# Patient Record
Sex: FEMALE | Race: WHITE | HISPANIC OR LATINO | Employment: FULL TIME | URBAN - METROPOLITAN AREA
[De-identification: names, ages, dates, MRNs, and addresses within clinical notes are randomized per-mention and may not be internally consistent; named-entity substitution may affect disease eponyms.]

---

## 2018-03-14 ENCOUNTER — OFFICE VISIT (OUTPATIENT)
Dept: MEDICAL GROUP | Facility: LAB | Age: 28
End: 2018-03-14
Payer: COMMERCIAL

## 2018-03-14 VITALS
HEART RATE: 100 BPM | WEIGHT: 191 LBS | TEMPERATURE: 99.1 F | SYSTOLIC BLOOD PRESSURE: 148 MMHG | OXYGEN SATURATION: 98 % | HEIGHT: 66 IN | RESPIRATION RATE: 16 BRPM | BODY MASS INDEX: 30.7 KG/M2 | DIASTOLIC BLOOD PRESSURE: 106 MMHG

## 2018-03-14 DIAGNOSIS — E66.9 OBESITY (BMI 30-39.9): ICD-10-CM

## 2018-03-14 DIAGNOSIS — R03.0 ELEVATED BLOOD PRESSURE READING: ICD-10-CM

## 2018-03-14 DIAGNOSIS — Z30.41 ENCOUNTER FOR SURVEILLANCE OF CONTRACEPTIVE PILLS: ICD-10-CM

## 2018-03-14 PROBLEM — R45.0 NERVOUSNESS: Status: ACTIVE | Noted: 2018-03-14

## 2018-03-14 PROCEDURE — 99204 OFFICE O/P NEW MOD 45 MIN: CPT | Performed by: FAMILY MEDICINE

## 2018-03-14 RX ORDER — NORGESTIMATE AND ETHINYL ESTRADIOL 0.25-0.035
1 KIT ORAL DAILY
Qty: 84 TAB | Refills: 4 | Status: SHIPPED | OUTPATIENT
Start: 2018-03-14 | End: 2019-03-18 | Stop reason: SDUPTHER

## 2018-03-14 ASSESSMENT — PATIENT HEALTH QUESTIONNAIRE - PHQ9: CLINICAL INTERPRETATION OF PHQ2 SCORE: 0

## 2018-03-15 NOTE — PROGRESS NOTES
Federica Acuña is a 27 y.o. female here for   Chief Complaint   Patient presents with   • Establish Care    - birth control    HPI:  Federica is a very pleasant 27 y.o. female. Chief here today to establish care.    1. Obesity (BMI 30-39.9)  This is a new problem to discuss with me today. Patient reports several year history of increasing weight. This started about age 21. She did see her primary care physician at [place] for this. She was told that they may need to monitor her thyroid. This has never been done. Of note, the patient does have constipation at times, tremor or nervousness, cold intolerance. No skin or hair changes. No neck swelling. She has been exercising regularly.    2. Encounter for surveillance of contraceptive pills  This is new to discuss with me today. Patient has been on Sprintec for several years and likes this. She states that she would like children in the future. Perhaps in about 2 years.  No family or personal history of clotting disorders, PEs, or DVTs.  Patient does not smoke. She is not sedentary.     3. Elevated blood pressure reading  This is a new problem to discuss acne. Patient states that her blood pressure has been slightly elevated in the past but never this high. She has had some increased life stress test today that she is attributing this to. She does not take her blood pressure at home. sHe denies any headaches, chest pain, shortness of breath, vision changes, lower extremity edema, difficulty urinating.        Current medicines (including changes today)  Current Outpatient Prescriptions   Medication Sig Dispense Refill   • norgestimate-ethinyl estradiol (ORTHO-CYCLEN) 0.25-35 MG-MCG per tablet Take 1 Tab by mouth every day. 84 Tab 4     No current facility-administered medications for this visit.      She  has a past medical history of Pyloric stenosis.  She  has a past surgical history that includes abdominal exploration.  Social History   Substance Use  "Topics   • Smoking status: Never Smoker   • Smokeless tobacco: Never Used   • Alcohol use Yes      Comment: rare     Social History     Social History Narrative   • No narrative on file     Family History   Problem Relation Age of Onset   • Hypertension Mother    • Hyperlipidemia Mother    • Hypertension Father    • Cancer Maternal Grandmother 27     breast     Family Status   Relation Status   • Mother Alive   • Father Alive   • Maternal Grandmother          ROS  Positive for weight gain, itchy eyes and nose, nocturia  All other systems reviewed and are negative     Objective:     Blood pressure 148/106, pulse 100, temperature 37.3 °C (99.1 °F), resp. rate 16, height 1.676 m (5' 6\"), weight 86.6 kg (191 lb), SpO2 98 %. Body mass index is 30.83 kg/m².  Physical Exam:    Constitutional: Alert, no distress.  Skin: Warm, dry, good turgor, no rashes in visible areas.  Eye: Equal, round and reactive, conjunctiva clear, lids normal.  ENMT: Lips without lesions, good dentition, oropharynx clear. TM's pearly gray with normal light reflexes bilaterally  Neck: Trachea midline, no masses, no thyromegaly. No cervical or supraclavicular lymphadenopathy.  Respiratory: Unlabored respiratory effort, lungs clear to auscultation bilaterally, no wheezes, no ronchi.  Cardiovascular: Normal S1, S2, RRR, no murmur, no edema.  Abdomen: Soft, non-tender, no masses, no hepatosplenomegaly.  Psych: Alert and oriented x3, normal affect and mood.      Assessment and Plan:   The following treatment plan was discussed    1. Obesity (BMI 30-39.9)  This is a new problem. Patient has a strong family tree of diabetes. We are going to check labs for secondary causes such as thyroid dysfunction as well as check her cholesterol and glucose levels.  - Patient identified as having weight management issue.  Appropriate orders and counseling given.  - TSH; Future  - FREE THYROXINE; Future  - COMP METABOLIC PANEL; Future  - CBC WITH DIFFERENTIAL; Future  - " LIPID PROFILE; Future    2. Encounter for surveillance of contraceptive pills  This is a new problem. Refill of her Sprintec given   Discussed OCPs at length. Instructed on importance of taking pill at the same time every day. Use back-up for up to 2 weeks. Discussed risks associated with OCPs including: blood clot, heart attack, and stroke. Advised not to smoke while on hormonal birth control.  - norgestimate-ethinyl estradiol (ORTHO-CYCLEN) 0.25-35 MG-MCG per tablet; Take 1 Tab by mouth every day.  Dispense: 84 Tab; Refill: 4    3. Elevated blood pressure reading  This is a new problem. She is going to keep a home blood pressure log and check labs. We will have her back    in the next 2-3 weeks to go over her labs and recheck her blood pressure. She may need medication. She is in childbearing age so we will need to be mindful of this when starting a blood pressure medication.  - TSH; Future  - FREE THYROXINE; Future  - COMP METABOLIC PANEL; Future  - CBC WITH DIFFERENTIAL; Future      Records requested.  Followup: Return for blood pressure.         This note was created using voice recognition software. I have made every reasonable attempt to correct errors, however, I do anticipate some grammatical errors.

## 2018-03-17 ENCOUNTER — HOSPITAL ENCOUNTER (OUTPATIENT)
Dept: LAB | Facility: MEDICAL CENTER | Age: 28
End: 2018-03-17
Attending: FAMILY MEDICINE
Payer: COMMERCIAL

## 2018-03-17 DIAGNOSIS — E66.9 OBESITY (BMI 30-39.9): ICD-10-CM

## 2018-03-17 DIAGNOSIS — R03.0 ELEVATED BLOOD PRESSURE READING: ICD-10-CM

## 2018-03-17 LAB
ALBUMIN SERPL BCP-MCNC: 3.9 G/DL (ref 3.2–4.9)
ALBUMIN/GLOB SERPL: 1.2 G/DL
ALP SERPL-CCNC: 63 U/L (ref 30–99)
ALT SERPL-CCNC: 18 U/L (ref 2–50)
ANION GAP SERPL CALC-SCNC: 8 MMOL/L (ref 0–11.9)
AST SERPL-CCNC: 17 U/L (ref 12–45)
BASOPHILS # BLD AUTO: 1.5 % (ref 0–1.8)
BASOPHILS # BLD: 0.1 K/UL (ref 0–0.12)
BILIRUB SERPL-MCNC: 0.3 MG/DL (ref 0.1–1.5)
BUN SERPL-MCNC: 7 MG/DL (ref 8–22)
CALCIUM SERPL-MCNC: 8.8 MG/DL (ref 8.5–10.5)
CHLORIDE SERPL-SCNC: 108 MMOL/L (ref 96–112)
CHOLEST SERPL-MCNC: 166 MG/DL (ref 100–199)
CO2 SERPL-SCNC: 23 MMOL/L (ref 20–33)
CREAT SERPL-MCNC: 0.66 MG/DL (ref 0.5–1.4)
EOSINOPHIL # BLD AUTO: 0.13 K/UL (ref 0–0.51)
EOSINOPHIL NFR BLD: 2 % (ref 0–6.9)
ERYTHROCYTE [DISTWIDTH] IN BLOOD BY AUTOMATED COUNT: 38 FL (ref 35.9–50)
GLOBULIN SER CALC-MCNC: 3.2 G/DL (ref 1.9–3.5)
GLUCOSE SERPL-MCNC: 93 MG/DL (ref 65–99)
HCT VFR BLD AUTO: 47 % (ref 37–47)
HDLC SERPL-MCNC: 39 MG/DL
HGB BLD-MCNC: 15.3 G/DL (ref 12–16)
IMM GRANULOCYTES # BLD AUTO: 0.02 K/UL (ref 0–0.11)
IMM GRANULOCYTES NFR BLD AUTO: 0.3 % (ref 0–0.9)
LDLC SERPL CALC-MCNC: 91 MG/DL
LYMPHOCYTES # BLD AUTO: 2.13 K/UL (ref 1–4.8)
LYMPHOCYTES NFR BLD: 32.9 % (ref 22–41)
MCH RBC QN AUTO: 26.4 PG (ref 27–33)
MCHC RBC AUTO-ENTMCNC: 32.6 G/DL (ref 33.6–35)
MCV RBC AUTO: 81 FL (ref 81.4–97.8)
MONOCYTES # BLD AUTO: 0.35 K/UL (ref 0–0.85)
MONOCYTES NFR BLD AUTO: 5.4 % (ref 0–13.4)
NEUTROPHILS # BLD AUTO: 3.75 K/UL (ref 2–7.15)
NEUTROPHILS NFR BLD: 57.9 % (ref 44–72)
NRBC # BLD AUTO: 0 K/UL
NRBC BLD-RTO: 0 /100 WBC
PLATELET # BLD AUTO: 349 K/UL (ref 164–446)
PMV BLD AUTO: 11.2 FL (ref 9–12.9)
POTASSIUM SERPL-SCNC: 3.6 MMOL/L (ref 3.6–5.5)
PROT SERPL-MCNC: 7.1 G/DL (ref 6–8.2)
RBC # BLD AUTO: 5.8 M/UL (ref 4.2–5.4)
SODIUM SERPL-SCNC: 139 MMOL/L (ref 135–145)
T4 FREE SERPL-MCNC: 0.83 NG/DL (ref 0.53–1.43)
TRIGL SERPL-MCNC: 180 MG/DL (ref 0–149)
TSH SERPL DL<=0.005 MIU/L-ACNC: 1.46 UIU/ML (ref 0.38–5.33)
WBC # BLD AUTO: 6.5 K/UL (ref 4.8–10.8)

## 2018-03-17 PROCEDURE — 36415 COLL VENOUS BLD VENIPUNCTURE: CPT

## 2018-03-17 PROCEDURE — 84439 ASSAY OF FREE THYROXINE: CPT

## 2018-03-17 PROCEDURE — 84443 ASSAY THYROID STIM HORMONE: CPT

## 2018-03-17 PROCEDURE — 80061 LIPID PANEL: CPT

## 2018-03-17 PROCEDURE — 80053 COMPREHEN METABOLIC PANEL: CPT

## 2018-03-17 PROCEDURE — 85025 COMPLETE CBC W/AUTO DIFF WBC: CPT

## 2018-03-20 ENCOUNTER — TELEPHONE (OUTPATIENT)
Dept: MEDICAL GROUP | Facility: LAB | Age: 28
End: 2018-03-20

## 2018-03-20 NOTE — TELEPHONE ENCOUNTER
----- Message from Cristina Machuca M.D. sent at 3/18/2018  3:36 PM PDT -----  Please let Federica know that her labs look good (including thyroid) other than some elevated cholesterol. We will discuss this at our appointment upcoming next month.     Cristina Machuca M.D.

## 2018-04-11 ENCOUNTER — OFFICE VISIT (OUTPATIENT)
Dept: MEDICAL GROUP | Facility: LAB | Age: 28
End: 2018-04-11
Payer: COMMERCIAL

## 2018-04-11 VITALS
BODY MASS INDEX: 30.7 KG/M2 | HEIGHT: 66 IN | HEART RATE: 96 BPM | OXYGEN SATURATION: 97 % | RESPIRATION RATE: 16 BRPM | SYSTOLIC BLOOD PRESSURE: 134 MMHG | DIASTOLIC BLOOD PRESSURE: 86 MMHG | WEIGHT: 191 LBS | TEMPERATURE: 98.1 F

## 2018-04-11 DIAGNOSIS — R03.0 ELEVATED BLOOD PRESSURE READING: ICD-10-CM

## 2018-04-11 DIAGNOSIS — E66.9 OBESITY (BMI 30-39.9): ICD-10-CM

## 2018-04-11 DIAGNOSIS — Z00.00 HEALTH MAINTENANCE EXAMINATION: ICD-10-CM

## 2018-04-11 DIAGNOSIS — E78.5 DYSLIPIDEMIA: ICD-10-CM

## 2018-04-11 DIAGNOSIS — R71.8 MICROCYTOSIS: ICD-10-CM

## 2018-04-11 PROCEDURE — 99395 PREV VISIT EST AGE 18-39: CPT | Performed by: FAMILY MEDICINE

## 2018-04-11 NOTE — PATIENT INSTRUCTIONS
"DASH Eating Plan  DASH stands for \"Dietary Approaches to Stop Hypertension.\" The DASH eating plan is a healthy eating plan that has been shown to reduce high blood pressure (hypertension). Additional health benefits may include reducing the risk of type 2 diabetes mellitus, heart disease, and stroke. The DASH eating plan may also help with weight loss.  What do I need to know about the DASH eating plan?  For the DASH eating plan, you will follow these general guidelines:  · Choose foods with less than 150 milligrams of sodium per serving (as listed on the food label).  · Use salt-free seasonings or herbs instead of table salt or sea salt.  · Check with your health care provider or pharmacist before using salt substitutes.  · Eat lower-sodium products. These are often labeled as \"low-sodium\" or \"no salt added.\"  · Eat fresh foods. Avoid eating a lot of canned foods.  · Eat more vegetables, fruits, and low-fat dairy products.  · Choose whole grains. Look for the word \"whole\" as the first word in the ingredient list.  · Choose fish and skinless chicken or turkey more often than red meat. Limit fish, poultry, and meat to 6 oz (170 g) each day.  · Limit sweets, desserts, sugars, and sugary drinks.  · Choose heart-healthy fats.  · Eat more home-cooked food and less restaurant, buffet, and fast food.  · Limit fried foods.  · Do not headley foods. Cook foods using methods such as baking, boiling, grilling, and broiling instead.  · When eating at a restaurant, ask that your food be prepared with less salt, or no salt if possible.  What foods can I eat?  Seek help from a dietitian for individual calorie needs.  Grains   Whole grain or whole wheat bread. Brown rice. Whole grain or whole wheat pasta. Quinoa, bulgur, and whole grain cereals. Low-sodium cereals. Corn or whole wheat flour tortillas. Whole grain cornbread. Whole grain crackers. Low-sodium crackers.  Vegetables   Fresh or frozen vegetables (raw, steamed, roasted, or " grilled). Low-sodium or reduced-sodium tomato and vegetable juices. Low-sodium or reduced-sodium tomato sauce and paste. Low-sodium or reduced-sodium canned vegetables.  Fruits   All fresh, canned (in natural juice), or frozen fruits.  Meat and Other Protein Products   Ground beef (85% or leaner), grass-fed beef, or beef trimmed of fat. Skinless chicken or turkey. Ground chicken or turkey. Pork trimmed of fat. All fish and seafood. Eggs. Dried beans, peas, or lentils. Unsalted nuts and seeds. Unsalted canned beans.  Dairy   Low-fat dairy products, such as skim or 1% milk, 2% or reduced-fat cheeses, low-fat ricotta or cottage cheese, or plain low-fat yogurt. Low-sodium or reduced-sodium cheeses.  Fats and Oils   Tub margarines without trans fats. Light or reduced-fat mayonnaise and salad dressings (reduced sodium). Avocado. Safflower, olive, or canola oils. Natural peanut or almond butter.  Other   Unsalted popcorn and pretzels.  The items listed above may not be a complete list of recommended foods or beverages. Contact your dietitian for more options.   What foods are not recommended?  Grains   White bread. White pasta. White rice. Refined cornbread. Bagels and croissants. Crackers that contain trans fat.  Vegetables   Creamed or fried vegetables. Vegetables in a cheese sauce. Regular canned vegetables. Regular canned tomato sauce and paste. Regular tomato and vegetable juices.  Fruits   Canned fruit in light or heavy syrup. Fruit juice.  Meat and Other Protein Products   Fatty cuts of meat. Ribs, chicken wings, sky, sausage, bologna, salami, chitterlings, fatback, hot dogs, bratwurst, and packaged luncheon meats. Salted nuts and seeds. Canned beans with salt.  Dairy   Whole or 2% milk, cream, half-and-half, and cream cheese. Whole-fat or sweetened yogurt. Full-fat cheeses or blue cheese. Nondairy creamers and whipped toppings. Processed cheese, cheese spreads, or cheese curds.  Condiments   Onion and garlic  salt, seasoned salt, table salt, and sea salt. Canned and packaged gravies. Worcestershire sauce. Tartar sauce. Barbecue sauce. Teriyaki sauce. Soy sauce, including reduced sodium. Steak sauce. Fish sauce. Oyster sauce. Cocktail sauce. Horseradish. Ketchup and mustard. Meat flavorings and tenderizers. Bouillon cubes. Hot sauce. Tabasco sauce. Marinades. Taco seasonings. Relishes.  Fats and Oils   Butter, stick margarine, lard, shortening, ghee, and sky fat. Coconut, palm kernel, or palm oils. Regular salad dressings.  Other   Pickles and olives. Salted popcorn and pretzels.  The items listed above may not be a complete list of foods and beverages to avoid. Contact your dietitian for more information.   Where can I find more information?  National [employer], [employer], and [employer] [place]: www.nhlbi.nih.gov/health/health-topics/topics/dash/  This information is not intended to replace advice given to you by your health care provider. Make sure you discuss any questions you have with your health care provider.  Document Released: 12/06/2012 Document Revised: 05/25/2017 Document Reviewed: 10/22/2014  Elsevier Interactive Patient Education © 2017 [employer]

## 2018-04-11 NOTE — PROGRESS NOTES
Subjective:   Darrion Acuña is a 27 y.o. female here today for   Chief Complaint   Patient presents with   • Results     labs       1. Health maintenance examination  Pap done 9/2016  Tdap UTD  Has been working on diet and exercise - has gotten down to 160s in the past, but difficulty recently  Did receive HPV vaccines as a teenager   She has not had chickenpox but is unsure if she has received the varicella vaccination. She is pretty sure she received hepatitis A and B vaccinations.  Results for DARRION ACUÑA (MRN [id number]) as of 4/11/2018 12:49   Ref. Range 3/17/2018 09:31   WBC Latest Ref Range: 4.8 - 10.8 K/uL 6.5   RBC Latest Ref Range: 4.20 - 5.40 M/uL 5.80 (H)   Hemoglobin Latest Ref Range: 12.0 - 16.0 g/dL 15.3   Hematocrit Latest Ref Range: 37.0 - 47.0 % 47.0   MCV Latest Ref Range: 81.4 - 97.8 fL 81.0 (L)   MCH Latest Ref Range: 27.0 - 33.0 pg 26.4 (L)   MCHC Latest Ref Range: 33.6 - 35.0 g/dL 32.6 (L)   RDW Latest Ref Range: 35.9 - 50.0 fL 38.0   Platelet Count Latest Ref Range: 164 - 446 K/uL 349   MPV Latest Ref Range: 9.0 - 12.9 fL 11.2   Neutrophils-Polys Latest Ref Range: 44.00 - 72.00 % 57.90   Neutrophils (Absolute) Latest Ref Range: 2.00 - 7.15 K/uL 3.75   Lymphocytes Latest Ref Range: 22.00 - 41.00 % 32.90   Lymphs (Absolute) Latest Ref Range: 1.00 - 4.80 K/uL 2.13   Monocytes Latest Ref Range: 0.00 - 13.40 % 5.40   Monos (Absolute) Latest Ref Range: 0.00 - 0.85 K/uL 0.35   Eosinophils Latest Ref Range: 0.00 - 6.90 % 2.00   Eos (Absolute) Latest Ref Range: 0.00 - 0.51 K/uL 0.13   Basophils Latest Ref Range: 0.00 - 1.80 % 1.50   Baso (Absolute) Latest Ref Range: 0.00 - 0.12 K/uL 0.10   Immature Granulocytes Latest Ref Range: 0.00 - 0.90 % 0.30   Immature Granulocytes (abs) Latest Ref Range: 0.00 - 0.11 K/uL 0.02   Nucleated RBC Latest Units: /100 WBC 0.00   NRBC (Absolute) Latest Units: K/uL 0.00   Sodium Latest Ref Range: 135 - 145 mmol/L 139   Potassium Latest Ref Range:  3.6 - 5.5 mmol/L 3.6   Chloride Latest Ref Range: 96 - 112 mmol/L 108   Co2 Latest Ref Range: 20 - 33 mmol/L 23   Anion Gap Latest Ref Range: 0.0 - 11.9  8.0   Glucose Latest Ref Range: 65 - 99 mg/dL 93   Bun Latest Ref Range: 8 - 22 mg/dL 7 (L)   Creatinine Latest Ref Range: 0.50 - 1.40 mg/dL 0.66   GFR If  Latest Ref Range: >60 mL/min/1.73 m 2 >60   GFR If Non  Latest Ref Range: >60 mL/min/1.73 m 2 >60   Calcium Latest Ref Range: 8.5 - 10.5 mg/dL 8.8   AST(SGOT) Latest Ref Range: 12 - 45 U/L 17   ALT(SGPT) Latest Ref Range: 2 - 50 U/L 18   Alkaline Phosphatase Latest Ref Range: 30 - 99 U/L 63   Total Bilirubin Latest Ref Range: 0.1 - 1.5 mg/dL 0.3   Albumin Latest Ref Range: 3.2 - 4.9 g/dL 3.9   Total Protein Latest Ref Range: 6.0 - 8.2 g/dL 7.1   Globulin Latest Ref Range: 1.9 - 3.5 g/dL 3.2   A-G Ratio Latest Units: g/dL 1.2   Cholesterol,Tot Latest Ref Range: 100 - 199 mg/dL 166   Triglycerides Latest Ref Range: 0 - 149 mg/dL 180 (H)   HDL Latest Ref Range: >=40 mg/dL 39 (A)   LDL Latest Ref Range: <100 mg/dL 91   TSH Latest Ref Range: 0.380 - 5.330 uIU/mL 1.460   Free T-4 Latest Ref Range: 0.53 - 1.43 ng/dL 0.83     2. Dyslipidemia  This is a new problem. Mildly elevated cholesterol. She is actively working on weight loss with diet and exercise.    3. Microcytosis  This is a new problem. Very mildly decreased MCV. Normal hemoglobin and hematocrit. Patient is having regular periods. She is on oral contraceptives. No GI bleeding.    4. Obesity  This is chronic. Patient is having a difficult time with weight loss. Thyroid function is normal. Elevated LDL. She is walking but no high intensity exercise.    5. Elevated blood pressure  This is a chronic problem since last visit. We aren't going to be monitoring this closely. She has not been using any home blood pressure monitoring. ROS She denies any headaches, chest pain, shortness of breath, vision changes, lower extremity edema,  "difficulty urinating.      Current medicines (including changes today)  Current Outpatient Prescriptions   Medication Sig Dispense Refill   • norgestimate-ethinyl estradiol (ORTHO-CYCLEN) 0.25-35 MG-MCG per tablet Take 1 Tab by mouth every day. 84 Tab 4     No current facility-administered medications for this visit.      She  has a past medical history of Dyslipidemia (4/11/2018) and Pyloric stenosis.    ROS   No fevers  No bowel changes  No LE edema       Objective:     Blood pressure 134/86, pulse 96, temperature 36.7 °C (98.1 °F), resp. rate 16, height 1.676 m (5' 6\"), weight 86.6 kg (191 lb), SpO2 97 %. Body mass index is 30.83 kg/m².   Physical Exam:  Constitutional: Alert, no distress.  Skin: Warm, dry, good turgor, no rashes in visible areas.  Eye: Equal, round and reactive, conjunctiva clear, lids normal.  ENMT: Lips without lesions, good dentition, oropharynx clear.  Neck: Trachea midline, no masses, no thyromegaly. No cervical or supraclavicular lymphadenopathy  Respiratory: Unlabored respiratory effort, lungs clear to auscultation, no wheezes, no ronchi.  Cardiovascular: Normal S1, S2, RRR, no murmur, no edema.  Abdomen: Soft, non-tender, no masses, no hepatosplenomegaly.  Psych: Alert and oriented x3, normal affect and mood.        Assessment and Plan:   The following treatment plan was discussed    1. Health maintenance examination  Age-appropriate counseling given, we will check [facility] to update her vaccination records. Labs reviewed today. Pap smear up-to-date. Diet and exercise discussed and recommended today    2. Dyslipidemia  This is a new problem, mildly elevated LDL. Dietary modifications and handouts given today  - REFERRAL TO NUTRITION SERVICES    3. Microcytosis  This is a new problem and is possibly related to mild iron deficiency. No anemia. We will get iron studies and recheck labs next year. Discussed iron rich foods    4. Obesity (BMI 30-39.9)  This is a chronic problem and is having " difficult time with weight loss. I referred her to a nutritionist after discussion with her. She is cooking at home and tried to make healthy lifestyle choices. She is walking regularly but is not drinking any high intensity activity.  - REFERRAL TO NUTRITION SERVICES    5. Elevated blood pressure reading  This is a chronic problem, significantly improved from last visit. Discussed again the DASH diet and exercise and diet. We will monitor this every 3 months. We are keeping her off of medications at this time    Followup: Return in about 3 months (around 7/11/2018) for blood pressure, weight.       This note was created using voice recognition software. I have made every reasonable attempt to correct errors, however, I do anticipate some grammatical errors.

## 2018-10-11 ENCOUNTER — IMMUNIZATION (OUTPATIENT)
Dept: SOCIAL WORK | Facility: CLINIC | Age: 28
End: 2018-10-11
Payer: COMMERCIAL

## 2018-10-11 DIAGNOSIS — Z23 NEED FOR VACCINATION: ICD-10-CM

## 2018-10-11 PROCEDURE — 90686 IIV4 VACC NO PRSV 0.5 ML IM: CPT | Performed by: REGISTERED NURSE

## 2018-10-11 PROCEDURE — 90471 IMMUNIZATION ADMIN: CPT | Performed by: REGISTERED NURSE

## 2019-08-06 ENCOUNTER — OFFICE VISIT (OUTPATIENT)
Dept: MEDICAL GROUP | Facility: LAB | Age: 29
End: 2019-08-06
Payer: COMMERCIAL

## 2019-08-06 VITALS
WEIGHT: 196.6 LBS | HEART RATE: 74 BPM | SYSTOLIC BLOOD PRESSURE: 130 MMHG | OXYGEN SATURATION: 98 % | HEIGHT: 66 IN | DIASTOLIC BLOOD PRESSURE: 84 MMHG | RESPIRATION RATE: 18 BRPM | BODY MASS INDEX: 31.6 KG/M2 | TEMPERATURE: 98.1 F

## 2019-08-06 DIAGNOSIS — Z30.41 ENCOUNTER FOR SURVEILLANCE OF CONTRACEPTIVE PILLS: ICD-10-CM

## 2019-08-06 DIAGNOSIS — E66.9 OBESITY (BMI 30-39.9): ICD-10-CM

## 2019-08-06 DIAGNOSIS — J30.9 ALLERGIC RHINITIS, UNSPECIFIED SEASONALITY, UNSPECIFIED TRIGGER: ICD-10-CM

## 2019-08-06 DIAGNOSIS — G43.909 MIGRAINE WITHOUT STATUS MIGRAINOSUS, NOT INTRACTABLE, UNSPECIFIED MIGRAINE TYPE: ICD-10-CM

## 2019-08-06 PROCEDURE — 99214 OFFICE O/P EST MOD 30 MIN: CPT | Performed by: NURSE PRACTITIONER

## 2019-08-06 RX ORDER — SUMATRIPTAN 50 MG/1
50 TABLET, FILM COATED ORAL
Qty: 10 TAB | Refills: 3 | Status: SHIPPED | OUTPATIENT
Start: 2019-08-06 | End: 2020-09-13

## 2019-08-06 RX ORDER — FLUTICASONE PROPIONATE 50 MCG
2 SPRAY, SUSPENSION (ML) NASAL DAILY
Qty: 1 BOTTLE | Refills: 11 | Status: SHIPPED | OUTPATIENT
Start: 2019-08-06 | End: 2020-09-13

## 2019-08-06 RX ORDER — NORGESTIMATE AND ETHINYL ESTRADIOL 0.25-0.035
KIT ORAL
Qty: 28 TAB | Refills: 11 | Status: SHIPPED | OUTPATIENT
Start: 2019-08-06 | End: 2020-06-09

## 2019-08-06 ASSESSMENT — PATIENT HEALTH QUESTIONNAIRE - PHQ9: CLINICAL INTERPRETATION OF PHQ2 SCORE: 0

## 2019-08-06 NOTE — ASSESSMENT & PLAN NOTE
New to discuss.  Patient reports  generalized, bitemporal, occipital headaches described as: moderate in severity , sharp, throbbing with nausea and photophobia, without radiation  Present since age 11 years old. Usual frequency is 1-2 monthly  Timing from onset to full blown headache is 15 mins and lasts 1-3 days  Headaches are relieved by Excedrin. Dark room, laying down resting  Previous treatment and prevention include:   Known triggers include: stress weather changes, poor sleep.   Current stressors include: work, finance, family. Usually sleeps 8 hours per night.   Denies difficulty with speech or swallowing, facial numbness or tingling, focal weakness. confusion, impaired alertness or consciousness.  Denies sore throat or cough, fever, sinus congestion, ear pain, tooth clenching or grinding.   Denies history of fever, weight loss, cancer, pregnancy, immunocompromised state or seizures.  Denies head trauma, illicit drug use, or toxic exposure; headache awakens from sleep, is worse with Valsalva maneuvers, or is precipitated by cough, exertion, or sexual activity  Family Hx: no known family members with significant headaches  Prior imaging: no

## 2019-08-06 NOTE — ASSESSMENT & PLAN NOTE
Patient is currently on Sprintec doing well.    Patient does not smoke. She is not sedentary.   Family Planning: Current method OCP  Current method: OCPs. Taking daily as directed. No/rare missed doses.  She is not using condoms in addition.  She is sexually active with 1 partner. Denies pelvic pain, vaginal discharge.   Denies bothersome bleeding, new headaches, mood changes, new acne.   No personal or family history DVT, PE, uncontrolled HTN.

## 2019-08-06 NOTE — PROGRESS NOTES
CC:Diagnoses of Obesity (BMI 30-39.9), Migraine without status migrainosus, not intractable, unspecified migraine type, Allergic rhinitis, unspecified seasonality, unspecified trigger, and Encounter for surveillance of contraceptive pills were pertinent to this visit.    HISTORY OF PRESENT ILLNESS: Federica Acuña is a 28 y.o. female established patient who presents today to discuss the following problems:    Migraine without status migrainosus, not intractable  New to discuss.  Patient reports  generalized, bitemporal, occipital headaches described as: moderate in severity , sharp, throbbing with nausea and photophobia, without radiation  Present since age 11 years old. Usual frequency is 1-2 monthly  Timing from onset to full blown headache is 15 mins and lasts 1-3 days  Headaches are relieved by Excedrin. Dark room, laying down resting  Previous treatment and prevention include:   Known triggers include: stress weather changes, poor sleep.   Current stressors include: work, finance, family. Usually sleeps 8 hours per night.   Denies difficulty with speech or swallowing, facial numbness or tingling, focal weakness. confusion, impaired alertness or consciousness.  Denies sore throat or cough, fever, sinus congestion, ear pain, tooth clenching or grinding.   Denies history of fever, weight loss, cancer, pregnancy, immunocompromised state or seizures.  Denies head trauma, illicit drug use, or toxic exposure; headache awakens from sleep, is worse with Valsalva maneuvers, or is precipitated by cough, exertion, or sexual activity  Family Hx: no known family members with significant headaches  Prior imaging: no        Obesity (BMI 30-39.9)  Patient and I discussed the importance of lifestyle changes, with particular emphasis on decreasing sugar and carbohydrate intake and increasing plant-based nutrition (for the purposes of weight loss, general health, and prevention of chronic illnesses), as well as regular  cardiovascular exercise, proper sleep, and stress management. Patient verbalized understanding.      Encounter for surveillance of contraceptive pills  Patient is currently on Sprintec doing well.    Patient does not smoke. She is not sedentary.   Family Planning: Current method OCP  Current method: OCPs. Taking daily as directed. No/rare missed doses.  She is not using condoms in addition.  She is sexually active with 1 partner. Denies pelvic pain, vaginal discharge.   Denies bothersome bleeding, new headaches, mood changes, new acne.   No personal or family history DVT, PE, uncontrolled HTN.       Health Maintenance: Completed    Patient Active Problem List    Diagnosis Date Noted   • Migraine without status migrainosus, not intractable 08/06/2019   • Allergic rhinitis 08/06/2019   • Dyslipidemia 04/11/2018   • Microcytosis 04/11/2018   • Obesity (BMI 30-39.9) 03/14/2018   • Nervousness 03/14/2018   • Encounter for surveillance of contraceptive pills 03/14/2018   • Elevated blood pressure reading 03/14/2018        Allergies:Patient has no known allergies.    Current Outpatient Medications   Medication Sig Dispense Refill   • fluticasone (FLONASE) 50 MCG/ACT nasal spray Spray 2 Sprays in nose every day. 1 Bottle 11   • SUMAtriptan (IMITREX) 50 MG Tab Take 1 Tab by mouth Once PRN for Migraine for up to 1 dose. 10 Tab 3   • norgestimate-ethinyl estradiol (SPRINTEC 28) 0.25-35 MG-MCG per tablet TAKE 1 TABLET BY MOUTH ONCE DAILY 28 Tab 11     No current facility-administered medications for this visit.        Social History     Tobacco Use   • Smoking status: Never Smoker   • Smokeless tobacco: Never Used   Substance Use Topics   • Alcohol use: Yes     Comment: rare   • Drug use: No     Social History     Social History Narrative   • Not on file       Family History   Problem Relation Age of Onset   • Hypertension Mother    • Hyperlipidemia Mother    • Hypertension Father    • Cancer Maternal Grandmother 27         "breast       ROS:     No fevers or weight loss  No sore throat  No chest pain or shortness of breath  No bowel changes  No lower extremity edema  No suicidal ideation or panic attack        EXAM:   /84 (BP Location: Left arm, Patient Position: Sitting, BP Cuff Size: Adult)   Pulse 74   Temp 36.7 °C (98.1 °F) (Temporal)   Resp 18   Ht 1.676 m (5' 6\")   Wt 89.2 kg (196 lb 9.6 oz)   SpO2 98%  Body mass index is 31.73 kg/m².    Constitutional: Obese. Well-developed and well-nourished. Not diaphoretic. No distress.   Skin: Skin is warm and dry. No rash noted.  Head: Atraumatic without lesions.  Eyes: Conjunctivae and extraocular motions are normal. Pupils are equal, round, and reactive to light. No scleral icterus.   Ears:  External ears unremarkable. Tympanic membranes clear and intact.  Nose: Nares patent. Erythematous mucosa. No discharge. There is facial tenderness.  Mouth/Throat: Tongue normal. Oropharynx is clear and moist. Posterior pharynx without erythema or exudates.  Neck: Supple, trachea midline. No thyromegaly present. No cervical or supraclavicular lymphadenopathy.  Cardiovascular: Regular rate and rhythm.   Chest: Effort normal. Clear to auscultation throughout. No adventitious sounds.   Neurological: Alert and oriented x 3. Sensation intact.   Psychiatric:  Behavior, mood, and affect are appropriate.       ASSESSMENT/PLAN:    1. Obesity (BMI 30-39.9)  - Patient identified as having weight management issue.  Appropriate orders and counseling given.    2. Migraine without status migrainosus, not intractable, unspecified migraine type  Triggers should be identified and avoided when possible - keep headache diary. Suspect increased stress as another trigger. Advised to sleep regular hours, eat small but frequent meals, hydrate appropriately( minimum of 40-60 oz per day), and exercise regularly (minimum 30 minutes, three times a week). Daily caffeine intake should be limited to less than 100 mg " which she is already doing.  Eliminate foods that lower the threshold for migraine, that is, chocolate, aged and yellow cheese, caffeine, red wine, dark beer, shellfish, and meats processed with nitrates. Patient can consider acupuncture treatments to decrease severity/frequency. Suspect increased stress as another trigger - can benefit from relaxation techniques and maintaining a regular exercise routine    - SUMAtriptan (IMITREX) 50 MG Tab; Take 1 Tab by mouth Once PRN for Migraine for up to 1 dose.  Dispense: 10 Tab; Refill: 3    3. Allergic rhinitis, unspecified seasonality, unspecified trigger  Uncontrolled.  Patient given conservative care instructions regarding allergies (OTC antihistamines, nasal sprays [anti-histamines, steroids], nasal saline rinses, hydration, cautious use of nasal decongestants like pseudoephedrine).  Patient verbalizes understanding.    - fluticasone (FLONASE) 50 MCG/ACT nasal spray; Spray 2 Sprays in nose every day.  Dispense: 1 Bottle; Refill: 11    4. Encounter for surveillance of contraceptive pills    - norgestimate-ethinyl estradiol (SPRINTEC 28) 0.25-35 MG-MCG per tablet; TAKE 1 TABLET BY MOUTH ONCE DAILY  Dispense: 28 Tab; Refill: 11      Return if symptoms worsen or fail to improve, for Routine Follow up.       Please note that this dictation was created using voice recognition software. I have made every reasonable attempt to correct obvious errors, but I expect that there are errors of grammar and possibly content that I did not discover before finalizing the note.

## 2020-05-20 ENCOUNTER — HOSPITAL ENCOUNTER (OUTPATIENT)
Facility: MEDICAL CENTER | Age: 30
End: 2020-05-20
Payer: COMMERCIAL

## 2020-05-22 LAB
SARS-COV-2 RNA SPEC QL NAA+PROBE: NOT DETECTED
SPECIMEN SOURCE: NORMAL

## 2020-06-08 DIAGNOSIS — Z30.41 ENCOUNTER FOR SURVEILLANCE OF CONTRACEPTIVE PILLS: ICD-10-CM

## 2020-06-09 RX ORDER — NORGESTIMATE AND ETHINYL ESTRADIOL 0.25-0.035
KIT ORAL
Qty: 28 TAB | Refills: 0 | Status: SHIPPED | OUTPATIENT
Start: 2020-06-09 | End: 2020-07-07

## 2020-07-01 DIAGNOSIS — Z30.41 ENCOUNTER FOR SURVEILLANCE OF CONTRACEPTIVE PILLS: ICD-10-CM

## 2020-07-02 NOTE — TELEPHONE ENCOUNTER
Received request via: Pharmacy    Was the patient seen in the last year in this department? Yes  8/6/19  Does the patient have an active prescription (recently filled or refills available) for medication(s) requested? No

## 2020-07-07 RX ORDER — NORGESTIMATE AND ETHINYL ESTRADIOL 0.25-0.035
KIT ORAL
Qty: 28 TAB | Refills: 0 | Status: SHIPPED | OUTPATIENT
Start: 2020-07-07 | End: 2020-07-08 | Stop reason: SDUPTHER

## 2020-07-08 ENCOUNTER — OFFICE VISIT (OUTPATIENT)
Dept: MEDICAL GROUP | Facility: LAB | Age: 30
End: 2020-07-08
Payer: COMMERCIAL

## 2020-07-08 VITALS
DIASTOLIC BLOOD PRESSURE: 82 MMHG | OXYGEN SATURATION: 97 % | SYSTOLIC BLOOD PRESSURE: 126 MMHG | RESPIRATION RATE: 12 BRPM | HEART RATE: 85 BPM | TEMPERATURE: 98.6 F | WEIGHT: 190 LBS | BODY MASS INDEX: 30.53 KG/M2 | HEIGHT: 66 IN

## 2020-07-08 DIAGNOSIS — Z30.41 ENCOUNTER FOR SURVEILLANCE OF CONTRACEPTIVE PILLS: ICD-10-CM

## 2020-07-08 DIAGNOSIS — J30.9 ALLERGIC RHINITIS, UNSPECIFIED SEASONALITY, UNSPECIFIED TRIGGER: ICD-10-CM

## 2020-07-08 DIAGNOSIS — Z76.89 ENCOUNTER TO ESTABLISH CARE: ICD-10-CM

## 2020-07-08 DIAGNOSIS — E78.1 HYPERTRIGLYCERIDEMIA: ICD-10-CM

## 2020-07-08 DIAGNOSIS — G43.909 MIGRAINE WITHOUT STATUS MIGRAINOSUS, NOT INTRACTABLE, UNSPECIFIED MIGRAINE TYPE: ICD-10-CM

## 2020-07-08 PROBLEM — E78.5 DYSLIPIDEMIA: Status: RESOLVED | Noted: 2018-04-11 | Resolved: 2020-07-08

## 2020-07-08 PROCEDURE — 99395 PREV VISIT EST AGE 18-39: CPT | Performed by: FAMILY MEDICINE

## 2020-07-08 RX ORDER — NORGESTIMATE AND ETHINYL ESTRADIOL 0.25-0.035
KIT ORAL
Qty: 28 TAB | Refills: 11 | Status: SHIPPED | OUTPATIENT
Start: 2020-07-08 | End: 2020-12-18 | Stop reason: SDUPTHER

## 2020-07-08 SDOH — HEALTH STABILITY: MENTAL HEALTH: HOW OFTEN DO YOU HAVE A DRINK CONTAINING ALCOHOL?: MONTHLY OR LESS

## 2020-07-08 SDOH — HEALTH STABILITY: MENTAL HEALTH: HOW MANY STANDARD DRINKS CONTAINING ALCOHOL DO YOU HAVE ON A TYPICAL DAY?: 1 OR 2

## 2020-07-08 ASSESSMENT — ENCOUNTER SYMPTOMS
FEVER: 0
WHEEZING: 0
PALPITATIONS: 0
DIARRHEA: 0
VOMITING: 0
BLOOD IN STOOL: 0
ABDOMINAL PAIN: 0
CONSTIPATION: 0
NAUSEA: 0
BLURRED VISION: 0
CHILLS: 0
SHORTNESS OF BREATH: 0

## 2020-07-08 ASSESSMENT — PATIENT HEALTH QUESTIONNAIRE - PHQ9: CLINICAL INTERPRETATION OF PHQ2 SCORE: 0

## 2020-07-08 NOTE — PROGRESS NOTES
Subjective:   Federica Acuña is a 29 y.o. female here today for   Chief Complaint   Patient presents with   • Establish Care   • Annual Exam   • Medication Refill     Birth control,      #Establish care:  -Reviewed all past medical history, family history, social history.  -Reviewed all screenings/vaccinations needed: Pap smear needed at this time.  -Currently work on healthy diet, exercise regimen.  -Currently , no children, sexually active.  No concerns regarding relationship.  -Any tobacco use, occasional drug use.    #Migraines   -Chronic condition, new to me,   -Long history of migraines, Trggers include stress, lack of sleep.   -Takes Imitrex and ibuprofen for control.  States usually takes the Imitrex approximately once every month or so.  -Denies any aura.  -Happy with control at this time no concerns.    #Allergies   -Chronic condition, new to me.  Longstanding history of allergic rhinitis for which she treats with Flonase daily.  Symptoms are usually worse in the spring, denies any fevers, chills, chest pain, shortness of breath.    #Birth control   -Currently on Sprintec.  Has been on birth control for the last 2 years.  -No side effects of medication, compliant taking regularly.  -States that since starting birth control periods have been regular, happy with medication, requesting refill at this time.    #Triglyceridemia:  -Previous labs completed on 3/17/2018 shows an elevated triglycerides of 180.  Patient states that she works on healthy diet, regular exercise.  There are been on any medication for treatment of cholesterol or elevated triglycerides.    No Known Allergies      Current medicines (including changes today)  Current Outpatient Medications   Medication Sig Dispense Refill   • norgestimate-ethinyl estradiol (SPRINTEC 28) 0.25-35 MG-MCG per tablet Take 1 tablet by mouth once daily 28 Tab 0   • fluticasone (FLONASE) 50 MCG/ACT nasal spray Spray 2 Sprays in nose every day. 1  "Bottle 11   • SUMAtriptan (IMITREX) 50 MG Tab Take 1 Tab by mouth Once PRN for Migraine for up to 1 dose. 10 Tab 3     No current facility-administered medications for this visit.      She  has a past medical history of Dyslipidemia (4/11/2018) and Pyloric stenosis.    ROS   Review of Systems   Constitutional: Negative for chills and fever.   HENT: Negative for hearing loss.    Eyes: Negative for blurred vision.   Respiratory: Negative for shortness of breath and wheezing.    Cardiovascular: Negative for chest pain and palpitations.   Gastrointestinal: Negative for abdominal pain, blood in stool, constipation, diarrhea, nausea and vomiting.   All other systems reviewed and are negative.       Objective:     Physical Exam:  /82 (BP Location: Right arm, Patient Position: Sitting, BP Cuff Size: Adult)   Pulse 85   Temp 37 °C (98.6 °F) (Temporal)   Resp 12   Ht 1.676 m (5' 6\")   Wt 86.2 kg (190 lb)   SpO2 97%  Body mass index is 30.67 kg/m².   Constitutional: Alert, no distress.  Skin: Warm, dry, good turgor, no rashes in visible areas.  Eye: Equal, round and reactive, conjunctiva clear, lids normal.  ENMT: TM's clear bilaterally, lips without lesions, good dentition, oropharynx clear.  Neck: Trachea midline, no masses, no thyromegaly. No cervical or supraclavicular lymphadenopathy.  Respiratory: Unlabored respiratory effort, lungs clear to auscultation, no wheezes, no rhonchi.  Cardiovascular: Normal S1, S2, no murmur, no edema.  Abdomen: Soft, non-tender, no masses, no hepatosplenomegaly.  Psych: Alert and oriented x3, normal affect and mood.    Assessment and Plan:     1. Encounter to establish care  -Answered all questions concerns at this time.  -Patient will follow-up for Pap smear.  -Continue appropriate diet, exercise regimen.  -Labs as below.  -Follow-up for annual physical exam on a yearly basis.    2. Encounter for surveillance of contraceptive pills  -Status: Stable.  No concerns at this time.  " No labs in medication, thus no pregnancy test needed at this time.  Will refill medication as below, follow-up as needed.  - norgestimate-ethinyl estradiol (SPRINTEC 28) 0.25-35 MG-MCG per tablet; Take 1 tablet by mouth once daily  Dispense: 28 Tab; Refill: 11    3. Hypertriglyceridemia  -Discussed the importance of a low-cholesterol, low-fat diet, daily exercise.  Will check lipid profile again at this time.  - Lipid Profile; Future    4. Allergic rhinitis, unspecified seasonality, unspecified trigger  Status: Stable.  Continue with the Flonase as needed.  Follow-up as needed.    5. Migraine without status migrainosus, not intractable, unspecified migraine type  -Status: Stable.  Continue treatment with Imitrex, ibuprofen.  Follow-up as needed.    Followup: Return in about 1 year (around 7/8/2021), or if symptoms worsen or fail to improve, for PAP.         PLEASE NOTE: This dictation was created using voice recognition software. I have made every reasonable attempt to correct obvious errors, but I expect that there are errors of grammar and possibly content that I did not discover before finalizing the note.

## 2020-09-13 ENCOUNTER — OFFICE VISIT (OUTPATIENT)
Dept: URGENT CARE | Facility: PHYSICIAN GROUP | Age: 30
End: 2020-09-13
Payer: COMMERCIAL

## 2020-09-13 VITALS
OXYGEN SATURATION: 100 % | WEIGHT: 190 LBS | HEART RATE: 96 BPM | BODY MASS INDEX: 30.53 KG/M2 | HEIGHT: 66 IN | DIASTOLIC BLOOD PRESSURE: 102 MMHG | SYSTOLIC BLOOD PRESSURE: 164 MMHG | RESPIRATION RATE: 18 BRPM | TEMPERATURE: 98 F

## 2020-09-13 DIAGNOSIS — R03.0 ELEVATED BLOOD PRESSURE READING: ICD-10-CM

## 2020-09-13 DIAGNOSIS — R42 EPISODE OF DIZZINESS: ICD-10-CM

## 2020-09-13 PROCEDURE — 99214 OFFICE O/P EST MOD 30 MIN: CPT | Performed by: NURSE PRACTITIONER

## 2020-09-13 PROCEDURE — 93000 ELECTROCARDIOGRAM COMPLETE: CPT | Performed by: NURSE PRACTITIONER

## 2020-09-13 ASSESSMENT — ENCOUNTER SYMPTOMS
HEARTBURN: 0
PND: 0
WHEEZING: 0
COUGH: 0
PALPITATIONS: 0
SHORTNESS OF BREATH: 0
NAUSEA: 0
PHOTOPHOBIA: 0
ORTHOPNEA: 0
SINUS PAIN: 0
FOCAL WEAKNESS: 0
SORE THROAT: 0
CONSTIPATION: 0
MYALGIAS: 0
DOUBLE VISION: 0
ABDOMINAL PAIN: 0
TINGLING: 0
VOMITING: 0
MEMORY LOSS: 0
EYE PAIN: 0
DIARRHEA: 0
DIZZINESS: 1
HEADACHES: 0
BLURRED VISION: 0
FEVER: 0
BACK PAIN: 0
HEMOPTYSIS: 0
CHILLS: 0

## 2020-09-13 NOTE — PROGRESS NOTES
"Subjective:      Federica Acuña is a 29 y.o. female who presents with Dizziness (light headed xthis morning pt states bp was 161/126 x1hr)            Patient presents to urgent care with complaint of feeling lightheaded this morning.  Patient reports she was at a wedding yesterday and admits to drinking alcohol.  She does state that she has a history of getting high blood pressure when she drinks alcohol.  She states that she took her blood pressure at her parents house as they both have hypertension and she was concerned and her blood pressure was 164/120.  Patient presented to urgent care.  She states that she did eat a very salt heavy [employer] meal last night after the wedding.  She did wake up feeling \"hung over\".    BP Readings from Last 4 Encounters:  09/13/20 : (!) 164/102  07/08/20 : 126/82  08/06/19 : 130/84  04/11/18 : 134/86    She Denies vision changes, headache, palpitations, chest pain, SOB or leg swelling.      Dizziness  This is a new problem. The current episode started today. The problem occurs rarely. The problem has been gradually improving. Pertinent negatives include no abdominal pain, chest pain, chills, congestion, coughing, fever, headaches, myalgias, nausea, rash, sore throat, urinary symptoms or vomiting. Exacerbated by: ETOH. She has tried rest for the symptoms. The treatment provided mild relief.       Review of Systems   Constitutional: Negative for chills, fever and malaise/fatigue.   HENT: Negative for congestion, ear pain, sinus pain and sore throat.    Eyes: Negative for blurred vision, double vision, photophobia and pain.   Respiratory: Negative for cough, hemoptysis, shortness of breath and wheezing.    Cardiovascular: Negative for chest pain, palpitations, orthopnea, leg swelling and PND.   Gastrointestinal: Negative for abdominal pain, constipation, diarrhea, heartburn, nausea and vomiting.   Musculoskeletal: Negative for back pain, joint pain and myalgias.   Skin: " "Negative for rash.   Neurological: Positive for dizziness. Negative for tingling, focal weakness and headaches.   Psychiatric/Behavioral: Negative for memory loss and suicidal ideas.   All other systems reviewed and are negative.         Objective:     BP (!) 164/102 (BP Location: Left arm, Patient Position: Sitting, BP Cuff Size: Large adult)   Pulse 96   Temp 36.7 °C (98 °F) (Temporal)   Resp 18   Ht 1.676 m (5' 6\")   Wt 86.2 kg (190 lb)   SpO2 100%   BMI 30.67 kg/m²      Physical Exam  Vitals signs reviewed.   Constitutional:       General: She is not in acute distress.     Appearance: Normal appearance. She is not ill-appearing, toxic-appearing or diaphoretic.   HENT:      Head: Normocephalic.      Right Ear: Tympanic membrane, ear canal and external ear normal.      Left Ear: Tympanic membrane, ear canal and external ear normal.      Nose: Nose normal. No congestion.      Mouth/Throat:      Mouth: Mucous membranes are moist.   Eyes:      Extraocular Movements: Extraocular movements intact.      Conjunctiva/sclera: Conjunctivae normal.      Pupils: Pupils are equal, round, and reactive to light.   Neck:      Musculoskeletal: Normal range of motion and neck supple. No muscular tenderness.   Cardiovascular:      Rate and Rhythm: Normal rate and regular rhythm.      Pulses: Normal pulses.      Heart sounds: Normal heart sounds. No murmur.   Pulmonary:      Effort: Pulmonary effort is normal. No respiratory distress.      Breath sounds: Normal breath sounds. No wheezing, rhonchi or rales.   Abdominal:      General: Abdomen is flat. Bowel sounds are normal.      Palpations: Abdomen is soft.      Tenderness: There is no abdominal tenderness.   Musculoskeletal: Normal range of motion.   Lymphadenopathy:      Cervical: No cervical adenopathy.   Skin:     General: Skin is warm and dry.      Capillary Refill: Capillary refill takes less than 2 seconds.   Neurological:      Mental Status: She is alert and oriented " to person, place, and time.   Psychiatric:         Mood and Affect: Mood normal.         Behavior: Behavior normal.               EKG Interpretation-HR is 95 normal EKG, normal sinus rhythm, there are no previous tracings available for comparison, borderline prolonged QT interval      Assessment/Plan:        1. Episode of dizziness  EKG   2. Elevated blood pressure reading       Discussed elevated BP with patient today. .  Strict ER precautions were discussed.  Advised that his blood pressure is likely elevated due to her night of drinking ETOH and dietary indiscretion.  Advised her to take it BID over the next few days and if it does not go down she needs to follow-up with her PCP early next week.    Plan of care, medications and treatments reviewed with patient and or guardian.  Patient and or guardian voices understanding and agrees with the instructions provided. After visit summary reviewed with patient. Patient and or guardian understand the parameters for reevaluation and ER precautions discussed.     Follow up with primary care provider in the next 1-5 days for recheck as needed.  Discussed that urgent care setting has limited resources, therefore any worsening of symptoms should be evaluated in the ER. Patient and or guardian verbalized understanding.     Please note that this dictation was created using voice recognition software. I have made every reasonable attempt to correct obvious errors, but I expect that there are errors of grammar and possibly content that I did not discover before finalizing the note.

## 2020-09-14 ENCOUNTER — OFFICE VISIT (OUTPATIENT)
Dept: MEDICAL GROUP | Facility: LAB | Age: 30
End: 2020-09-14
Payer: COMMERCIAL

## 2020-09-14 VITALS
HEART RATE: 91 BPM | HEIGHT: 66 IN | OXYGEN SATURATION: 97 % | SYSTOLIC BLOOD PRESSURE: 172 MMHG | DIASTOLIC BLOOD PRESSURE: 118 MMHG | TEMPERATURE: 99.4 F | RESPIRATION RATE: 12 BRPM | WEIGHT: 195 LBS | BODY MASS INDEX: 31.34 KG/M2

## 2020-09-14 DIAGNOSIS — I10 ESSENTIAL HYPERTENSION: ICD-10-CM

## 2020-09-14 PROCEDURE — 99214 OFFICE O/P EST MOD 30 MIN: CPT | Performed by: FAMILY MEDICINE

## 2020-09-14 RX ORDER — LISINOPRIL AND HYDROCHLOROTHIAZIDE 12.5; 1 MG/1; MG/1
1 TABLET ORAL DAILY
Qty: 30 TAB | Refills: 3 | Status: SHIPPED | OUTPATIENT
Start: 2020-09-14 | End: 2021-01-11

## 2020-09-14 NOTE — PROGRESS NOTES
Subjective:   Federica Acuña is a 29 y.o. female here today for   Chief Complaint   Patient presents with   • Hospital Follow-up     [facility] F/V, noticing hypertension some symtpoms, lightheaded, fainting if she doesnt eat by noon, dizziness (167/120) Laying down thoat feels stuck higher. Burning neck sensation        #Hypertension:  -Patient is a 29-year-old female who has had borderline hypertension previously; however, last week she is noted elevated blood pressures in the 190s to 200s over 90s 100s.  She states that she started becoming symptomatic, becoming very dizzy, at times nauseated, at times fatigue with his new blood pressure.  Is recently seen in urgent care on 9/13/2020.  EKG was done at that time, unremarkable.  She was told to follow-up with primary care provider.  She states that she continues to be symptomatic at this time and here to discuss possible evaluation and treatment.  -Patient states that she has a significant family history of hypertension on both sides of her family.      No Known Allergies      Current medicines (including changes today)  Current Outpatient Medications   Medication Sig Dispense Refill   • norgestimate-ethinyl estradiol (SPRINTEC 28) 0.25-35 MG-MCG per tablet Take 1 tablet by mouth once daily 28 Tab 11     No current facility-administered medications for this visit.      She  has a past medical history of Dyslipidemia (4/11/2018) and Pyloric stenosis.    ROS   Review of Systems   Constitutional: Negative for chills, fever and weight loss.   HENT: Negative for hearing loss and tinnitus.    Eyes: Positive for blurred vision. Negative for double vision.   Respiratory: Negative for shortness of breath and wheezing.    Cardiovascular: Negative for chest pain and palpitations.   Gastrointestinal: Positive for nausea. Negative for abdominal pain, diarrhea and vomiting.   Genitourinary: Negative for dysuria and urgency.   Skin: Negative for rash.   Neurological: Positive  "for dizziness and headaches.        Objective:     Physical Exam:  BP (!) 172/118   Pulse 91   Temp 37.4 °C (99.4 °F) (Temporal)   Resp 12   Ht 1.676 m (5' 5.98\")   Wt 88.5 kg (195 lb)   SpO2 97%  Body mass index is 31.49 kg/m².   Constitutional: Alert, no distress.  Skin: Warm, dry, good turgor, no rashes in visible areas.  Eye: Equal, round and reactive, conjunctiva clear, lids normal.  ENMT: TM's clear bilaterally, lips without lesions, good dentition, oropharynx clear.  Neck: Trachea midline, no masses, no thyromegaly. No cervical or supraclavicular lymphadenopathy.  Respiratory: Unlabored respiratory effort, lungs clear to auscultation, no wheezes, no rhonchi.  Cardiovascular: Normal S1, S2, no murmur, no edema.  Abdomen: Soft, non-tender, no masses, no hepatosplenomegaly.  Psych: Alert and oriented x3, normal affect and mood.  Neuro: Cranial nerves I through XII intact, no focal motor/sensory deficits.    Assessment and Plan:     1. Essential hypertension  --This is a new diagnosis, we now have elevated blood pressure recorded in 2 different locations at 2 different times.  We will begin treatment with lisinopril-hydrochlorothiazide 10-12.5 mg.  -Discussed the importance of a proper DASH diet and 20/30 Ms. Vazquez activity daily.  -Patient seems very young, with a BMI slightly above obese to be having such elevated blood pressures.  At this time I am concerned for secondary causes and will order lab panel as below.  -Physical examination was benign, no carotid bruits, no abdominal bruits, neurologically nonfocal exam.  -Patient will follow-up in 1 month to reassess blood pressures.  In the interim patient will be checking her blood pressures regularly and bring journal to next appointment.  - CBC WITH DIFFERENTIAL; Future  - Comp Metabolic Panel; Future  - HEMOGLOBIN A1C; Future  - Lipid Profile; Future  - TSH WITH REFLEX TO FT4; Future  - lisinopril-hydrochlorothiazide (PRINZIDE) 10-12.5 MG per tablet; " Take 1 Tab by mouth every day.  Dispense: 30 Tab; Refill: 3  - VMA RANDOM URINE; Future  - RENIN ACTIVITY; Future  - ALDOSTERONE; Future    Patient was seen for 25 minutes face to face of which > 50% of appointment time was spent on counseling and coordination of care regarding the above.       Followup: Return in about 4 weeks (around 10/12/2020).         PLEASE NOTE: This dictation was created using voice recognition software. I have made every reasonable attempt to correct obvious errors, but I expect that there are errors of grammar and possibly content that I did not discover before finalizing the note.

## 2020-09-14 NOTE — LETTER
September 14, 2020       Patient: Federica Acuña   YOB: 1990   Date of Visit: 9/14/2020         To Whom It May Concern:    Please excuse Federica from work on 09/15/2020. In my medical opinion, I recommend that Federica Acuña return to work on 09/16/2020.    If you have any questions or concerns, please don't hesitate to call [phone]          Sincerely,          Sy Mehta M.D.  Electronically Signed

## 2020-09-15 ENCOUNTER — HOSPITAL ENCOUNTER (OUTPATIENT)
Dept: LAB | Facility: MEDICAL CENTER | Age: 30
End: 2020-09-15
Attending: FAMILY MEDICINE
Payer: COMMERCIAL

## 2020-09-15 DIAGNOSIS — I10 ESSENTIAL HYPERTENSION: ICD-10-CM

## 2020-09-15 LAB
ALBUMIN SERPL BCP-MCNC: 4.2 G/DL (ref 3.2–4.9)
ALBUMIN/GLOB SERPL: 1.4 G/DL
ALP SERPL-CCNC: 76 U/L (ref 30–99)
ALT SERPL-CCNC: 20 U/L (ref 2–50)
ANION GAP SERPL CALC-SCNC: 13 MMOL/L (ref 7–16)
AST SERPL-CCNC: 15 U/L (ref 12–45)
BASOPHILS # BLD AUTO: 0.9 % (ref 0–1.8)
BASOPHILS # BLD: 0.08 K/UL (ref 0–0.12)
BILIRUB SERPL-MCNC: 0.4 MG/DL (ref 0.1–1.5)
BUN SERPL-MCNC: 11 MG/DL (ref 8–22)
CALCIUM SERPL-MCNC: 9.3 MG/DL (ref 8.5–10.5)
CHLORIDE SERPL-SCNC: 100 MMOL/L (ref 96–112)
CHOLEST SERPL-MCNC: 200 MG/DL (ref 100–199)
CO2 SERPL-SCNC: 25 MMOL/L (ref 20–33)
CREAT SERPL-MCNC: 0.72 MG/DL (ref 0.5–1.4)
EOSINOPHIL # BLD AUTO: 0.15 K/UL (ref 0–0.51)
EOSINOPHIL NFR BLD: 1.6 % (ref 0–6.9)
ERYTHROCYTE [DISTWIDTH] IN BLOOD BY AUTOMATED COUNT: 40.2 FL (ref 35.9–50)
EST. AVERAGE GLUCOSE BLD GHB EST-MCNC: 120 MG/DL
GLOBULIN SER CALC-MCNC: 3.1 G/DL (ref 1.9–3.5)
GLUCOSE SERPL-MCNC: 92 MG/DL (ref 65–99)
HBA1C MFR BLD: 5.8 % (ref 0–5.6)
HCT VFR BLD AUTO: 46.9 % (ref 37–47)
HDLC SERPL-MCNC: 48 MG/DL
HGB BLD-MCNC: 15.5 G/DL (ref 12–16)
IMM GRANULOCYTES # BLD AUTO: 0.02 K/UL (ref 0–0.11)
IMM GRANULOCYTES NFR BLD AUTO: 0.2 % (ref 0–0.9)
LDLC SERPL CALC-MCNC: 104 MG/DL
LYMPHOCYTES # BLD AUTO: 3.14 K/UL (ref 1–4.8)
LYMPHOCYTES NFR BLD: 34.2 % (ref 22–41)
MCH RBC QN AUTO: 27.1 PG (ref 27–33)
MCHC RBC AUTO-ENTMCNC: 33 G/DL (ref 33.6–35)
MCV RBC AUTO: 82.1 FL (ref 81.4–97.8)
MONOCYTES # BLD AUTO: 0.49 K/UL (ref 0–0.85)
MONOCYTES NFR BLD AUTO: 5.3 % (ref 0–13.4)
NEUTROPHILS # BLD AUTO: 5.29 K/UL (ref 2–7.15)
NEUTROPHILS NFR BLD: 57.8 % (ref 44–72)
NRBC # BLD AUTO: 0 K/UL
NRBC BLD-RTO: 0 /100 WBC
PLATELET # BLD AUTO: 349 K/UL (ref 164–446)
PMV BLD AUTO: 11.4 FL (ref 9–12.9)
POTASSIUM SERPL-SCNC: 3.4 MMOL/L (ref 3.6–5.5)
PROT SERPL-MCNC: 7.3 G/DL (ref 6–8.2)
RBC # BLD AUTO: 5.71 M/UL (ref 4.2–5.4)
SODIUM SERPL-SCNC: 138 MMOL/L (ref 135–145)
TRIGL SERPL-MCNC: 239 MG/DL (ref 0–149)
TSH SERPL DL<=0.005 MIU/L-ACNC: 3.25 UIU/ML (ref 0.38–5.33)
WBC # BLD AUTO: 9.2 K/UL (ref 4.8–10.8)

## 2020-09-15 PROCEDURE — 84585 ASSAY OF URINE VMA: CPT

## 2020-09-15 PROCEDURE — 80061 LIPID PANEL: CPT

## 2020-09-15 PROCEDURE — 84244 ASSAY OF RENIN: CPT

## 2020-09-15 PROCEDURE — 84443 ASSAY THYROID STIM HORMONE: CPT

## 2020-09-15 PROCEDURE — 80053 COMPREHEN METABOLIC PANEL: CPT

## 2020-09-15 PROCEDURE — 82088 ASSAY OF ALDOSTERONE: CPT

## 2020-09-15 PROCEDURE — 36415 COLL VENOUS BLD VENIPUNCTURE: CPT

## 2020-09-15 PROCEDURE — 85025 COMPLETE CBC W/AUTO DIFF WBC: CPT

## 2020-09-15 PROCEDURE — 83036 HEMOGLOBIN GLYCOSYLATED A1C: CPT

## 2020-09-15 ASSESSMENT — ENCOUNTER SYMPTOMS
FEVER: 0
PALPITATIONS: 0
WEIGHT LOSS: 0
BLURRED VISION: 1
VOMITING: 0
DIARRHEA: 0
ABDOMINAL PAIN: 0
DIZZINESS: 1
SHORTNESS OF BREATH: 0
NAUSEA: 1
CHILLS: 0
WHEEZING: 0
HEADACHES: 1
DOUBLE VISION: 0

## 2020-09-17 LAB — ALDOST SERPL-MCNC: 9 NG/DL

## 2020-09-18 LAB
COLLECT DURATION TIME SPEC: NORMAL HRS
CREAT 24H UR-MCNC: 129 MG/DL
CREAT 24H UR-MRATE: NORMAL MG/D (ref 700–1600)
SPECIMEN VOL ?TM UR: NORMAL ML
VMA & CREAT 24H UR-IMP: NORMAL
VMA 24H UR-MCNC: 1.9 MG/L
VMA 24H UR-MRATE: NORMAL MG/D (ref 0–7)
VMA/CREAT 24H UR: 1 MG/GCR (ref 0–6)

## 2020-09-19 LAB — RENIN PLAS-CCNC: 5.7 NG/ML/HR

## 2020-09-25 ENCOUNTER — OFFICE VISIT (OUTPATIENT)
Dept: MEDICAL GROUP | Facility: LAB | Age: 30
End: 2020-09-25
Payer: COMMERCIAL

## 2020-09-25 VITALS
RESPIRATION RATE: 12 BRPM | BODY MASS INDEX: 30.37 KG/M2 | HEART RATE: 94 BPM | OXYGEN SATURATION: 97 % | WEIGHT: 189 LBS | DIASTOLIC BLOOD PRESSURE: 86 MMHG | TEMPERATURE: 98.8 F | HEIGHT: 66 IN | SYSTOLIC BLOOD PRESSURE: 116 MMHG

## 2020-09-25 DIAGNOSIS — G56.92 NEUROPATHY, ARM, LEFT: ICD-10-CM

## 2020-09-25 DIAGNOSIS — I10 ESSENTIAL HYPERTENSION: ICD-10-CM

## 2020-09-25 PROCEDURE — 99213 OFFICE O/P EST LOW 20 MIN: CPT | Performed by: FAMILY MEDICINE

## 2020-09-25 ASSESSMENT — ENCOUNTER SYMPTOMS
FEVER: 0
SHORTNESS OF BREATH: 0
CHILLS: 0
PALPITATIONS: 0
ABDOMINAL PAIN: 0
NAUSEA: 0
DIARRHEA: 0
CONSTIPATION: 0
BLURRED VISION: 0
VOMITING: 0
WHEEZING: 0

## 2020-09-25 ASSESSMENT — FIBROSIS 4 INDEX: FIB4 SCORE: 0.26

## 2020-09-25 NOTE — PROGRESS NOTES
"Subjective:   Federica Acuña is a 29 y.o. female here today for   Chief Complaint   Patient presents with   • Chills     warm to touch, sees flashing, arm tingling, some numbness       #Left arm neuropathy:  -Patient states last night patient began having some numbness, tingling in her left arm.  She states this started after exercising with her .  Patient states that she is lived a sedentary life until last few days when she started working out, weightlifting with exercise .  She states that it began with pain, chills down her neck and then started having episodes of tingling, numbness in her left arm.  Last night on sleeping on it it became fully numb but then returned.  She states that this time she has some tingling in her fingers.  She states that pain is located in her first, second, and fourth finger.  She denies any weakness, loss of range of motion.  She never has any neck pain, chills, or other symptoms at this time.    #Essential hypertension:  -Patient was recently diagnosed, started on lisinopril-hydrochlorothiazide 10-12.5 mg.  Patient states been taking medication daily.  She states that she is undergone a complete \"lifestyle change\".  She states that she is exercising daily, avoiding carbohydrates, fatty foods, working on drinking more water.  She states that she is our last 5 pounds in the last 2 weeks, she is also noticed that her blood pressures, which has been checking every day, has drastically lowered.  She states her blood pressures are usually in the 1 teens over 80s  -Patient that she is feeling well, denies any headaches, headedness, dizziness, increased fatigue, syncope presyncopal episodes.    No Known Allergies      Current medicines (including changes today)  Current Outpatient Medications   Medication Sig Dispense Refill   • lisinopril-hydrochlorothiazide (PRINZIDE) 10-12.5 MG per tablet Take 1 Tab by mouth every day. 30 Tab 3   • norgestimate-ethinyl estradiol " "(SPRINTEC 28) 0.25-35 MG-MCG per tablet Take 1 tablet by mouth once daily 28 Tab 11     No current facility-administered medications for this visit.      She  has a past medical history of Dyslipidemia (4/11/2018) and Pyloric stenosis.    ROS   Review of Systems   Constitutional: Negative for chills and fever.   HENT: Negative for hearing loss and tinnitus.    Eyes: Negative for blurred vision.   Respiratory: Negative for shortness of breath and wheezing.    Cardiovascular: Negative for chest pain and palpitations.   Gastrointestinal: Negative for abdominal pain, constipation, diarrhea, nausea and vomiting.   Skin: Negative for rash.      Objective:     Physical Exam:  /86 (BP Location: Right arm, Patient Position: Sitting, BP Cuff Size: Adult)   Pulse 94   Temp 37.1 °C (98.8 °F)   Resp 12   Ht 1.676 m (5' 5.98\")   Wt 85.7 kg (189 lb)   SpO2 97%  Body mass index is 30.52 kg/m².   Constitutional: Alert, no distress.  Skin: Warm, dry, good turgor, no rashes in visible areas.  Eye: Equal, round and reactive, conjunctiva clear, lids normal.  ENMT: TM's clear bilaterally, lips without lesions, good dentition, oropharynx clear.  Neck: Trachea midline, no masses, no thyromegaly. No cervical or supraclavicular lymphadenopathy.  Respiratory: Unlabored respiratory effort, lungs clear to auscultation, no wheezes, no rhonchi.  Cardiovascular: Normal S1, S2, no murmur, no edema.  Abdomen: Soft, non-tender, no masses, no hepatosplenomegaly.  Psych: Alert and oriented x3, normal affect and mood.    Const: Vitals above. Well-appearing.  CV: Inspected/palpated for upper extremity edema. Bilateral radial pulses palpated, noting below if not 2+. Capillary refill evaluated distally, noting below if greater than 2 seconds.  Skin: Inspected for rash or skin lesions in area of concern.  Neuro/psych: Reflexes at brachioradialis (C5/6), biceps (C5/6), triceps (C7/8): 2+. Sensation to light touch evaluated at lateral arm (C5), " lateral forearm (C6), middle finger (C7), medial forearm (C8), and medial arm (T1). Tinel's test: negative. Observed for normal mood/affect/insight.  MSK: Gait and posture evaluated. Examination of bilateral elbows:  - Inspected/palpated bilaterally for upper extremity carriage and carrying angle, warmth, erythema/discoloration, effusion/swelling, deformity, and tenderness of the supracondylar humerus, medial/lateral epicondyles, olecranon/radial head, and cubital fossa.  - Assessed passive/active range of motion bilaterally in elbow flexion/extension, forearm pronation/supination, and wrist flexion/extension, noting below for pain or crepitation.  - Assessed muscle strength bilaterally in elbow flexion/extension, forearm pronation/supination, and wrist flexion/extension, noting below if less than 5/5 or pain.  - Assessed stability bilaterally with varus/valgus stress.  HAND PE:  Neuro/psych: Reflexes at brachioradialis (C5/6), biceps (C5/6), triceps (C7/8): 2+. 2-point discrimination assessed at 2nd finger (C6, median nerve), 3rd finger (C7, median nerve), 5th finger (C8, ulnar nerve), and dorsal web space between 1st/2nd digit (radial nerve). Phalen, Tinel's tests: negative. Observed for normal mood/affect/insight.  MSK: normal gait. Posture: unremarkable. Examination of bilateral wrist/hand:  - Insepected/palpated bilaterally for warmth, upper extremity carriage, ulnar variance, and for deformity and tenderness of the proximal/distal radius and ulna, scaphoid/snuffbox, carpals, metacarpals, phalanges, carpal/CMC/MCP/IP joints, and TFCC.  - Assessed passive/active range of motion bilaterally with forearm pronation/supination, wrist flexion/extension, wrist radial/ulnar deviation, MCP flexion/extension/abduction/adduction, IP joint flexion/extension, and thumb flexion/extension/abduction/adduction/opposition, noting below for any pain or crepitation.  - Assessed muscle strength bilaterally with wrist flexion (C6/7,  median/ulnar nerves), wrist extension (C7/8 radial nerve), finger extension (C7, radial nerve), finger abduction (T1, ulnar nerve), and thumb opposition (median nerve), noting below if less than 5/5 or pain. Observed for muscle atrophy in thenar, hypothenar, and interosseus areas.  - Assessed stability bilaterally at the TFCC (piano key test) and wrist, carpal, scapho-lunate (Shuck test), metacarpal, and phalangeal joints. Varus/valgus stress at MCP/IP joints: unremarkable. Finkelstein test negative.    All of the above were found to be normal      Assessment and Plan:     1. Neuropathy, arm, left  -Unsure etiology at this time, physical examination is negative.  At this time I recommend close and watchful monitoring of symptoms.  This could have been due to the new increase in exercise regimen.  If patient continues to have symptoms she will follow-up with me.  -Consider treatment measures were discussed including massage, heat, TENS as needed for sore muscles after working out.    2. Essential hypertension  -Status: Stable.  Blood pressure is much improved at this time.  Congratulated patient on her lifestyle change and encouraged continuation of proper diet and exercise lifestyle.  -We will continue with medication at this time  -We will follow-up in 3 months.      Followup: Return in about 3 months (around 12/25/2020).         PLEASE NOTE: This dictation was created using voice recognition software. I have made every reasonable attempt to correct obvious errors, but I expect that there are errors of grammar and possibly content that I did not discover before finalizing the note.

## 2020-10-15 ENCOUNTER — IMMUNIZATION (OUTPATIENT)
Dept: SOCIAL WORK | Facility: CLINIC | Age: 30
End: 2020-10-15
Payer: COMMERCIAL

## 2020-10-15 DIAGNOSIS — Z23 NEED FOR VACCINATION: ICD-10-CM

## 2020-10-15 PROCEDURE — 90471 IMMUNIZATION ADMIN: CPT | Performed by: REGISTERED NURSE

## 2020-10-15 PROCEDURE — 90686 IIV4 VACC NO PRSV 0.5 ML IM: CPT | Performed by: REGISTERED NURSE

## 2020-12-18 ENCOUNTER — OFFICE VISIT (OUTPATIENT)
Dept: MEDICAL GROUP | Facility: LAB | Age: 30
End: 2020-12-18
Payer: COMMERCIAL

## 2020-12-18 VITALS
TEMPERATURE: 96.5 F | OXYGEN SATURATION: 100 % | WEIGHT: 180.6 LBS | HEIGHT: 70 IN | SYSTOLIC BLOOD PRESSURE: 126 MMHG | BODY MASS INDEX: 25.86 KG/M2 | DIASTOLIC BLOOD PRESSURE: 76 MMHG | HEART RATE: 72 BPM

## 2020-12-18 DIAGNOSIS — I10 ESSENTIAL HYPERTENSION: ICD-10-CM

## 2020-12-18 DIAGNOSIS — R42 VERTIGO: ICD-10-CM

## 2020-12-18 DIAGNOSIS — Z30.41 ENCOUNTER FOR SURVEILLANCE OF CONTRACEPTIVE PILLS: ICD-10-CM

## 2020-12-18 PROCEDURE — 99214 OFFICE O/P EST MOD 30 MIN: CPT | Performed by: FAMILY MEDICINE

## 2020-12-18 RX ORDER — NORGESTIMATE AND ETHINYL ESTRADIOL 0.25-0.035
KIT ORAL
Qty: 28 TAB | Refills: 11 | Status: SHIPPED | OUTPATIENT
Start: 2020-12-18 | End: 2021-11-22 | Stop reason: SDUPTHER

## 2020-12-18 ASSESSMENT — ENCOUNTER SYMPTOMS
VOMITING: 0
DIARRHEA: 0
TINGLING: 0
BLURRED VISION: 0
SENSORY CHANGE: 0
WEAKNESS: 0
SHORTNESS OF BREATH: 0
BLOOD IN STOOL: 0
ABDOMINAL PAIN: 0
WHEEZING: 0
HEADACHES: 0
DIZZINESS: 1
NAUSEA: 1
CHILLS: 0
PALPITATIONS: 0
FEVER: 0

## 2020-12-18 ASSESSMENT — FIBROSIS 4 INDEX: FIB4 SCORE: 0.26

## 2020-12-19 NOTE — PROGRESS NOTES
Subjective:   Federica Acuña is a 29 y.o. female here today for   Chief Complaint   Patient presents with   • Follow-Up       #Hypertension:  -Patient currently on lisinopril-hydrochlorothiazide for treatment thereof.  She has been working on good diet, exercise is lost 20 pounds since diagnosed with high blood pressure.  Been compliant with medication, denies any side effects.  Has been checking her blood pressure regularly which has been at goal below 130/80.  She has no concerns regarding her blood pressure at this time.    #Vertigo:  -Patient has had 1 week of episodes of vertigo where she feels like the room is spinning.  They last several minutes, most severe one was up to 10 minutes.  She has had presyncopal episodes with these.  Does have slight nausea although no vomiting.  -Patient denies any palpitations during episodes.  Has been working on appropriate hydration, diet so no concerns regarding dehydration or hypoglycemia.      No Known Allergies      Current medicines (including changes today)  Current Outpatient Medications   Medication Sig Dispense Refill   • norgestimate-ethinyl estradiol (SPRINTEC 28) 0.25-35 MG-MCG per tablet Take 1 tablet by mouth once daily 28 Tab 11   • lisinopril-hydrochlorothiazide (PRINZIDE) 10-12.5 MG per tablet Take 1 Tab by mouth every day. 30 Tab 3     No current facility-administered medications for this visit.      She  has a past medical history of Dyslipidemia (4/11/2018) and Pyloric stenosis.    ROS   Review of Systems   Constitutional: Negative for chills and fever.   HENT: Negative for hearing loss.    Eyes: Negative for blurred vision.   Respiratory: Negative for shortness of breath and wheezing.    Cardiovascular: Negative for chest pain and palpitations.   Gastrointestinal: Positive for nausea. Negative for abdominal pain, blood in stool, diarrhea, melena and vomiting.   Skin: Negative for rash.   Neurological: Positive for dizziness. Negative for  "tingling, sensory change, weakness and headaches.      Objective:     Physical Exam:  /76 (BP Location: Right arm, Patient Position: Sitting, BP Cuff Size: Adult)   Pulse 72   Temp 35.8 °C (96.5 °F) (Temporal)   Ht 1.778 m (5' 10\")   Wt 81.9 kg (180 lb 9.6 oz)   SpO2 100%  Body mass index is 25.91 kg/m².   Constitutional: Alert, no distress.  Skin: Warm, dry, good turgor, no rashes in visible areas.  Eye: Equal, round and reactive, conjunctiva clear, lids normal.  ENMT: TM's clear bilaterally, lips without lesions, good dentition, oropharynx clear.  Neck: Trachea midline, no masses, no thyromegaly. No cervical or supraclavicular lymphadenopathy.  Respiratory: Unlabored respiratory effort, lungs clear to auscultation, no wheezes, no rhonchi.  Cardiovascular: Normal S1, S2, no murmur, no edema.  Abdomen: Soft, non-tender, no masses, no hepatosplenomegaly.  Psych: Alert and oriented x3, normal affect and mood.  Neuro: Cranial nerves I through XII are intact.  No focal motor/sensory deficits.  Charissa-Hallpike maneuver negative.    Assessment and Plan:     1. Vertigo  -Unsure etiology at this time.  Charissa-Hallpike maneuver is negative effectively ruling out BPPV.  I am concerned about possible cardiac etiology patient will pay attention if she begins experiencing any palpitations or rapid heart rate with dizziness.  We will check labs as below.  Return precautions were given and patient will follow-up as needed.  - CBC WITHOUT DIFFERENTIAL; Future  - Comp Metabolic Panel; Future  - TSH WITH REFLEX TO FT4; Future    2. Encounter for surveillance of contraceptive pills  -We will refill patient's contraceptives at this time.  Patient experiencing no side effects from medication and requesting refill.  - norgestimate-ethinyl estradiol (SPRINTEC 28) 0.25-35 MG-MCG per tablet; Take 1 tablet by mouth once daily  Dispense: 28 Tab; Refill: 11    3. Essential hypertension  Status: Stable.  Blood pressure is at goal.  We will " continue with current medications.  Patient will follow-up as needed.    Patient was seen for 25 minutes face to face of which > 50% of appointment time was spent on counseling and coordination of care regarding the above.      Followup: Return if symptoms worsen or fail to improve.         PLEASE NOTE: This dictation was created using voice recognition software. I have made every reasonable attempt to correct obvious errors, but I expect that there are errors of grammar and possibly content that I did not discover before finalizing the note.

## 2021-07-09 ENCOUNTER — APPOINTMENT (OUTPATIENT)
Dept: MEDICAL GROUP | Facility: LAB | Age: 31
End: 2021-07-09
Payer: COMMERCIAL

## 2021-11-22 DIAGNOSIS — Z30.41 ENCOUNTER FOR SURVEILLANCE OF CONTRACEPTIVE PILLS: ICD-10-CM

## 2021-11-22 DIAGNOSIS — I10 ESSENTIAL HYPERTENSION: ICD-10-CM

## 2021-11-22 RX ORDER — LISINOPRIL AND HYDROCHLOROTHIAZIDE 12.5; 1 MG/1; MG/1
1 TABLET ORAL DAILY
Qty: 90 TABLET | Refills: 0 | Status: SHIPPED | OUTPATIENT
Start: 2021-11-22 | End: 2021-12-08 | Stop reason: SDUPTHER

## 2021-11-22 RX ORDER — NORGESTIMATE AND ETHINYL ESTRADIOL 0.25-0.035
1 KIT ORAL DAILY
Qty: 30 TABLET | Refills: 2 | Status: SHIPPED | OUTPATIENT
Start: 2021-11-22 | End: 2021-12-08 | Stop reason: SDUPTHER

## 2021-11-22 NOTE — TELEPHONE ENCOUNTER
I will authorize refill of medication at this time; however, before any other refills are given patient must follow-up for visit as it has been approximately 1 year since last seen.  In order for me to prescribe medications the patient needs to be seen on a yearly basis.  Thank you.

## 2021-12-01 ENCOUNTER — OFFICE VISIT (OUTPATIENT)
Dept: MEDICAL GROUP | Facility: LAB | Age: 31
End: 2021-12-01
Payer: COMMERCIAL

## 2021-12-01 VITALS
DIASTOLIC BLOOD PRESSURE: 90 MMHG | SYSTOLIC BLOOD PRESSURE: 116 MMHG | HEIGHT: 68 IN | WEIGHT: 195.4 LBS | OXYGEN SATURATION: 98 % | BODY MASS INDEX: 29.61 KG/M2 | TEMPERATURE: 98.3 F | HEART RATE: 81 BPM | RESPIRATION RATE: 16 BRPM

## 2021-12-01 DIAGNOSIS — Z13.6 SCREENING FOR CARDIOVASCULAR CONDITION: ICD-10-CM

## 2021-12-01 DIAGNOSIS — I10 ESSENTIAL HYPERTENSION: ICD-10-CM

## 2021-12-01 DIAGNOSIS — Z00.00 ANNUAL PHYSICAL EXAM: ICD-10-CM

## 2021-12-01 PROBLEM — R03.0 ELEVATED BLOOD PRESSURE READING: Status: RESOLVED | Noted: 2018-03-14 | Resolved: 2021-12-01

## 2021-12-01 PROBLEM — R71.8 MICROCYTOSIS: Status: RESOLVED | Noted: 2018-04-11 | Resolved: 2021-12-01

## 2021-12-01 PROCEDURE — 99395 PREV VISIT EST AGE 18-39: CPT | Performed by: FAMILY MEDICINE

## 2021-12-01 ASSESSMENT — ENCOUNTER SYMPTOMS
ABDOMINAL PAIN: 0
DEPRESSION: 0
NERVOUS/ANXIOUS: 0
NAUSEA: 0
FEVER: 0
SHORTNESS OF BREATH: 0
WHEEZING: 0
BLURRED VISION: 0
CHILLS: 0
PALPITATIONS: 0
VOMITING: 0

## 2021-12-01 ASSESSMENT — PATIENT HEALTH QUESTIONNAIRE - PHQ9: CLINICAL INTERPRETATION OF PHQ2 SCORE: 0

## 2021-12-01 ASSESSMENT — FIBROSIS 4 INDEX: FIB4 SCORE: 0.27

## 2021-12-01 NOTE — PROGRESS NOTES
Subjective:   Federica Acuña is a 30 y.o. female here today for   Chief Complaint   Patient presents with   • Annual Exam       #Annual   -Reviewed all past medical history, family history, social history.  -Reviewed all screening/vaccinations: Due for influenza, PAP smear, labs   -Diet and Exercise: Working on improving diet and exercise goal of 20 lbs weight loss.   -Tobacco, alcohol, recreational drug use: no changes, see chart  -Sexually active: no changes, see chart   -Occupation: HR    #Hypertension   -Chronic condition currently treated lisinopril-hydrochlorothiazide. Compliant with medication, no side effects. Checking BP regularly, at goal.   -Is planning on becoming pregnant next year.     #Contraception   -Continuing with OCP; however, has been on and off for the last 2-3 months.       No Known Allergies      Current medicines (including changes today)  Current Outpatient Medications   Medication Sig Dispense Refill   • lisinopril-hydrochlorothiazide (PRINZIDE) 10-12.5 MG per tablet Take 1 Tablet by mouth every day for 90 days. 90 Tablet 0   • norgestimate-ethinyl estradiol (SPRINTEC 28) 0.25-35 MG-MCG per tablet Take 1 Tablet by mouth every day for 90 days. 30 Tablet 2     No current facility-administered medications for this visit.     She  has a past medical history of Dyslipidemia (4/11/2018) and Pyloric stenosis.    ROS   Review of Systems   Constitutional: Negative for chills and fever.   HENT: Negative for hearing loss.    Eyes: Negative for blurred vision.   Respiratory: Negative for shortness of breath and wheezing.    Cardiovascular: Negative for chest pain and palpitations.   Gastrointestinal: Negative for abdominal pain, nausea and vomiting.   Psychiatric/Behavioral: Negative for depression. The patient is not nervous/anxious.         Objective:     Physical Exam:  /90 (BP Location: Right arm, Patient Position: Sitting, BP Cuff Size: Adult)   Pulse 81   Temp 36.8 °C (98.3  "°F) (Temporal)   Resp 16   Ht 1.727 m (5' 8\")   Wt 88.6 kg (195 lb 6.4 oz)   SpO2 98%  Body mass index is 29.71 kg/m².   Constitutional: Alert, no distress.  Skin: Warm, dry, good turgor, no rashes in visible areas.  Eye: Equal, round and reactive, conjunctiva clear, lids normal.  ENMT: TM's clear bilaterally, lips without lesions, good dentition, oropharynx clear.  Neck: Trachea midline, no masses, no thyromegaly. No cervical or supraclavicular lymphadenopathy.  Respiratory: Unlabored respiratory effort, lungs clear to auscultation, no wheezes, no rhonchi.  Cardiovascular: Normal S1, S2, no murmur, no edema.  Abdomen: Soft, non-tender, no masses, no hepatosplenomegaly.  Psych: Alert and oriented x3, normal affect and mood.    Assessment and Plan:     1. Annual physical exam  -All questions concerns were answered at this time.  -Vaccinations/screenings needed at this time: Due for influenza vaccine, will follow up a later date.  Due for Pap smear, follow-up at a later date.  -Labs reviewed, no concerns, recheck labs as below.  -Discussed the importance of a healthy, Mediterranean style diet, routine exercise regimen.  -Discussed general safety measures including seatbelts, helmets, avoidance of smoking, sunscreen, hydration.  -Follow-up for general physical exam on a yearly basis, sooner if needed.  - CBC WITHOUT DIFFERENTIAL; Future  - Comp Metabolic Panel; Future    2. Essential hypertension  Status: Stable.  Blood pressure slightly elevated today; however, home blood pressures are unremarkable and at goal.  At this time we will continue the lisinopril-hydrochlorothiazide; however, next day when she begins trying for a child she will contact me which point we need to switch her to more appropriate medication such as atenolol.  Continue with appropriate diet and exercise regimen.  - Comp Metabolic Panel; Future    3. Screening for cardiovascular condition  - Lipid Profile; Future        Followup: Return in " about 4 weeks (around 12/29/2021) for PAP.         PLEASE NOTE: This dictation was created using voice recognition software. I have made every reasonable attempt to correct obvious errors, but I expect that there are errors of grammar and possibly content that I did not discover before finalizing the note.

## 2021-12-08 DIAGNOSIS — I10 ESSENTIAL HYPERTENSION: ICD-10-CM

## 2021-12-08 DIAGNOSIS — Z30.41 ENCOUNTER FOR SURVEILLANCE OF CONTRACEPTIVE PILLS: ICD-10-CM

## 2021-12-08 RX ORDER — LISINOPRIL AND HYDROCHLOROTHIAZIDE 12.5; 1 MG/1; MG/1
1 TABLET ORAL DAILY
Qty: 90 TABLET | Refills: 0 | Status: SHIPPED | OUTPATIENT
Start: 2021-12-08 | End: 2022-02-23

## 2021-12-08 RX ORDER — NORGESTIMATE AND ETHINYL ESTRADIOL 0.25-0.035
1 KIT ORAL DAILY
Qty: 30 TABLET | Refills: 2 | Status: SHIPPED | OUTPATIENT
Start: 2021-12-08 | End: 2022-03-02

## 2021-12-08 NOTE — TELEPHONE ENCOUNTER
Received request via: Pharmacy    Was the patient seen in the last year in this department? Yes  12/1/21  Does the patient have an active prescription (recently filled or refills available) for medication(s) requested? No

## 2022-02-04 ENCOUNTER — OFFICE VISIT (OUTPATIENT)
Dept: MEDICAL GROUP | Facility: LAB | Age: 32
End: 2022-02-04
Payer: COMMERCIAL

## 2022-02-04 ENCOUNTER — HOSPITAL ENCOUNTER (OUTPATIENT)
Facility: MEDICAL CENTER | Age: 32
End: 2022-02-04
Attending: FAMILY MEDICINE
Payer: COMMERCIAL

## 2022-02-04 VITALS
HEART RATE: 77 BPM | RESPIRATION RATE: 12 BRPM | BODY MASS INDEX: 29.55 KG/M2 | HEIGHT: 68 IN | SYSTOLIC BLOOD PRESSURE: 102 MMHG | DIASTOLIC BLOOD PRESSURE: 60 MMHG | OXYGEN SATURATION: 98 % | WEIGHT: 195 LBS | TEMPERATURE: 99.4 F

## 2022-02-04 DIAGNOSIS — N75.0 BARTHOLIN GLAND CYST: ICD-10-CM

## 2022-02-04 DIAGNOSIS — Z12.4 SCREENING FOR CERVICAL CANCER: ICD-10-CM

## 2022-02-04 PROCEDURE — 88175 CYTOPATH C/V AUTO FLUID REDO: CPT

## 2022-02-04 PROCEDURE — 99213 OFFICE O/P EST LOW 20 MIN: CPT | Performed by: FAMILY MEDICINE

## 2022-02-04 ASSESSMENT — ENCOUNTER SYMPTOMS
SHORTNESS OF BREATH: 0
CHILLS: 0
PALPITATIONS: 0
FEVER: 0
WHEEZING: 0
FLANK PAIN: 0

## 2022-02-04 ASSESSMENT — FIBROSIS 4 INDEX: FIB4 SCORE: 0.28

## 2022-02-04 NOTE — PROGRESS NOTES
"Subjective:   Federica Acuña is a 31 y.o. female here today for   Chief Complaint   Patient presents with   • Gynecologic Exam     #Women's health/Pap smear:  -Patient here for Pap smear.  -Patient is of one concern: Approximately 24 hours ago patient noticed a small, nontender, palpable mass on labia majora on the left side.  She denies any discharge, purulence.  Denies any trauma to the area.  -GYN history: Patient states she is only had 1 previous Pap smear to this 1.  Was normal with no concerns.  Patient is on birth control, having normal periods with cramping.  No previous GYN surgeries.    No Known Allergies      Current medicines (including changes today)  Current Outpatient Medications   Medication Sig Dispense Refill   • lisinopril-hydrochlorothiazide (PRINZIDE) 10-12.5 MG per tablet Take 1 Tablet by mouth every day for 90 days. 90 Tablet 0   • norgestimate-ethinyl estradiol (SPRINTEC 28) 0.25-35 MG-MCG per tablet Take 1 Tablet by mouth every day for 90 days. 30 Tablet 2     No current facility-administered medications for this visit.     She  has a past medical history of Dyslipidemia (4/11/2018) and Pyloric stenosis.    ROS   Review of Systems   Constitutional: Negative for chills and fever.   Respiratory: Negative for shortness of breath and wheezing.    Cardiovascular: Negative for chest pain and palpitations.   Genitourinary: Negative for dysuria, flank pain, frequency, hematuria and urgency.        Objective:     Physical Exam:  /60   Pulse 77   Temp 37.4 °C (99.4 °F) (Temporal)   Resp 12   Ht 1.727 m (5' 8\")   Wt 88.5 kg (195 lb)   SpO2 98%  Body mass index is 29.65 kg/m².   Constitutional: Alert, no distress.  Skin: Warm, dry, good turgor, no rashes in visible areas.  Eye: Equal, round and reactive, conjunctiva clear, lids normal.  Respiratory: Unlabored respiratory effort.  :Perineum and external genitalia normal without rash.  A small, 2 x 1 cm palpable mass located on " the inferior aspect of labia majora on the left side.  It is slightly mobile, with small, comedone-like aspect to the most superior portion of mass.  Nontender vagina with normal and physiologic discharge. Cervix without visible lesions or discharge. Bimanual exam without adnexal masses or cervical motion tenderness.  Psych: Alert and oriented x3, normal affect and mood.    Assessment and Plan:     1. Screening for cervical cancer  - THINPREP PAP, REFLEX HPV ON ASC-US AND ABOVE; Future    2. Bartholin gland cyst  -Discussed conservative treatment, continue to monitor symptoms, hot compresses needed.  Patient will follow-up if symptoms worsen.      Followup: Return if symptoms worsen or fail to improve.         PLEASE NOTE: This dictation was created using voice recognition software. I have made every reasonable attempt to correct obvious errors, but I expect that there are errors of grammar and possibly content that I did not discover before finalizing the note.

## 2022-02-05 DIAGNOSIS — Z12.4 SCREENING FOR CERVICAL CANCER: ICD-10-CM

## 2022-02-05 LAB — CYTOLOGY REG CYTOL: NORMAL

## 2022-03-02 DIAGNOSIS — Z30.41 ENCOUNTER FOR SURVEILLANCE OF CONTRACEPTIVE PILLS: ICD-10-CM

## 2022-03-02 RX ORDER — NORGESTIMATE AND ETHINYL ESTRADIOL 0.25-0.035
KIT ORAL
Qty: 84 TABLET | Refills: 6 | Status: SHIPPED | OUTPATIENT
Start: 2022-03-02 | End: 2022-03-18

## 2022-03-02 NOTE — TELEPHONE ENCOUNTER
Received request via: Pharmacy    Was the patient seen in the last year in this department? Yes  LOV 02/04/2022  Does the patient have an active prescription (recently filled or refills available) for medication(s) requested? No

## 2022-03-10 DIAGNOSIS — I10 ESSENTIAL HYPERTENSION: ICD-10-CM

## 2022-03-11 RX ORDER — LISINOPRIL AND HYDROCHLOROTHIAZIDE 12.5; 1 MG/1; MG/1
TABLET ORAL
Qty: 90 TABLET | Refills: 3 | Status: SHIPPED | OUTPATIENT
Start: 2022-03-11 | End: 2023-02-21

## 2022-03-11 NOTE — TELEPHONE ENCOUNTER
Received request via: Pharmacy    Was the patient seen in the last year in this department? Yes 2/4/22    Does the patient have an active prescription (recently filled or refills available) for medication(s) requested? No

## 2022-03-17 DIAGNOSIS — Z30.41 ENCOUNTER FOR SURVEILLANCE OF CONTRACEPTIVE PILLS: ICD-10-CM

## 2022-03-18 NOTE — TELEPHONE ENCOUNTER
Received request via: Pharmacy    Was the patient seen in the last year in this department? Yes  LOV 02/04/2022  Does the patient have an active prescription (recently filled or refills available) for medication(s) requested? No     Medication needs to be rerouted to the mail order pharmacy.

## 2022-11-23 ENCOUNTER — OFFICE VISIT (OUTPATIENT)
Dept: MEDICAL GROUP | Facility: LAB | Age: 32
End: 2022-11-23
Payer: COMMERCIAL

## 2022-11-23 VITALS
HEART RATE: 112 BPM | BODY MASS INDEX: 30.77 KG/M2 | RESPIRATION RATE: 14 BRPM | WEIGHT: 203 LBS | HEIGHT: 68 IN | TEMPERATURE: 97.3 F | OXYGEN SATURATION: 99 % | DIASTOLIC BLOOD PRESSURE: 84 MMHG | SYSTOLIC BLOOD PRESSURE: 114 MMHG

## 2022-11-23 DIAGNOSIS — R20.0 RIGHT LEG NUMBNESS: ICD-10-CM

## 2022-11-23 DIAGNOSIS — R20.0 RIGHT ARM NUMBNESS: ICD-10-CM

## 2022-11-23 PROCEDURE — 99214 OFFICE O/P EST MOD 30 MIN: CPT | Performed by: FAMILY MEDICINE

## 2022-11-23 ASSESSMENT — ENCOUNTER SYMPTOMS
FEVER: 0
SHORTNESS OF BREATH: 0
PALPITATIONS: 0
WHEEZING: 0
CHILLS: 0

## 2022-11-23 ASSESSMENT — PATIENT HEALTH QUESTIONNAIRE - PHQ9: CLINICAL INTERPRETATION OF PHQ2 SCORE: 0

## 2022-11-23 NOTE — PROGRESS NOTES
Subjective:   Federica Acuña is a 31 y.o. female here today for   Chief Complaint   Patient presents with    Tingling     X Sunday, Leg, arm and face on right side, Went away on Monday and progressing though out the day face would tingling, can still feel it tingling on her leg when sleeping.   X feels like when a limb falls asleep and tingles all over. No facial dropping        #Right-sided numbness:  -For the last month patient states that she has been having numbness; however, over the last week the numbness has progressed to be just on the right-hand side.  She states she will get numbness, tingling in upper and lower right extremities.  This will last only for seconds.  Typically is only occurring at night.  Usually happens when rolling from one side to another.  She denies any facial drooping, difficulty seeing, difficulty with swallowing, changes in vision.  She denies any acute trauma.  Denies any acute neck pain, back pain.  No recent change in the medication, denies any recreational illicit drug use.  Patient does have a history of high blood pressure, blood pressure is controlled and compliant with lisinopril-hydrochlorothiazide.    No Known Allergies      Current medicines (including changes today)  Current Outpatient Medications   Medication Sig Dispense Refill    SPRINTEC 28 0.25-35 MG-MCG per tablet TAKE 1 TABLET BY MOUTH  DAILY 84 Tablet 3    lisinopril-hydrochlorothiazide (PRINZIDE) 10-12.5 MG per tablet TAKE 1 TABLET BY MOUTH  DAILY 90 Tablet 3     No current facility-administered medications for this visit.     She  has a past medical history of Dyslipidemia (4/11/2018) and Pyloric stenosis.    ROS   Review of Systems   Constitutional:  Negative for chills, fever and malaise/fatigue.   Respiratory:  Negative for shortness of breath and wheezing.    Cardiovascular:  Negative for chest pain and palpitations.      Objective:     Physical Exam:  /84   Pulse (!) 112   Temp 36.3 °C  "(97.3 °F) (Temporal)   Resp 14   Ht 1.727 m (5' 7.99\")   Wt 92.1 kg (203 lb)   SpO2 99%  Body mass index is 30.87 kg/m².   Constitutional: Alert, no distress.  Skin: Warm, dry, good turgor, no rashes in visible areas.  Eye: Equal, round and reactive, conjunctiva clear, lids normal.  Respiratory: Unlabored respiratory effort, lungs clear to auscultation, no wheezes, no rhonchi.  Cardiovascular: Normal S1, S2, no murmur, no edema.  Abdomen: Soft, non-tender, no masses, no hepatosplenomegaly.  Psych: Alert and oriented x3, normal affect and mood.  Neuro: Cranial nerves I through XII are intact.  DTRs at Achilles, patella, bicep, tricep are 2+.  5/5 on strength with flexion, extension, abduction, abduction on shoulders, 5+5 at biceps on flexion extension. 5/5 strength in lower extremities.    Assessment and Plan:     1. Right arm numbness  -New problem, uncertain diagnosis.  No certain etiology noted at this time.  This is not appear to be caused from any back injury.  No signs of any cranial nerve changes.  At this time further evaluation is needed and will check labs to rule out any other cause such as anemia or thyroid.  We will also get EMG for further evaluation of nerve function in both upper and lower right extremity.  No red flag symptoms at this time; however, strict return precautions are given and patient will follow-up as needed.  - CBC WITHOUT DIFFERENTIAL; Future  - Comp Metabolic Panel; Future  - TSH WITH REFLEX TO FT4; Future  - Referral to Neurodiagnostics (EEG,EP,EMG/NCS/DBS)    2. Right leg numbness  - CBC WITHOUT DIFFERENTIAL; Future  - Comp Metabolic Panel; Future  - TSH WITH REFLEX TO FT4; Future  - Referral to Neurodiagnostics (EEG,EP,EMG/NCS/DBS)      Followup: No follow-ups on file.         PLEASE NOTE: This dictation was created using voice recognition software. I have made every reasonable attempt to correct obvious errors, but I expect that there are errors of grammar and possibly content " that I did not discover before finalizing the note.

## 2022-12-06 ENCOUNTER — HOSPITAL ENCOUNTER (OUTPATIENT)
Dept: LAB | Facility: MEDICAL CENTER | Age: 32
End: 2022-12-06
Attending: FAMILY MEDICINE
Payer: COMMERCIAL

## 2022-12-06 DIAGNOSIS — R20.0 RIGHT LEG NUMBNESS: ICD-10-CM

## 2022-12-06 DIAGNOSIS — R20.0 RIGHT ARM NUMBNESS: ICD-10-CM

## 2022-12-06 LAB
ALBUMIN SERPL BCP-MCNC: 4.4 G/DL (ref 3.2–4.9)
ALBUMIN/GLOB SERPL: 1.3 G/DL
ALP SERPL-CCNC: 70 U/L (ref 30–99)
ALT SERPL-CCNC: 42 U/L (ref 2–50)
ANION GAP SERPL CALC-SCNC: 15 MMOL/L (ref 7–16)
AST SERPL-CCNC: 32 U/L (ref 12–45)
BILIRUB SERPL-MCNC: 0.3 MG/DL (ref 0.1–1.5)
BUN SERPL-MCNC: 11 MG/DL (ref 8–22)
CALCIUM SERPL-MCNC: 9.9 MG/DL (ref 8.4–10.2)
CHLORIDE SERPL-SCNC: 101 MMOL/L (ref 96–112)
CO2 SERPL-SCNC: 21 MMOL/L (ref 20–33)
CREAT SERPL-MCNC: 0.77 MG/DL (ref 0.5–1.4)
ERYTHROCYTE [DISTWIDTH] IN BLOOD BY AUTOMATED COUNT: 36.9 FL (ref 35.9–50)
GFR SERPLBLD CREATININE-BSD FMLA CKD-EPI: 106 ML/MIN/1.73 M 2
GLOBULIN SER CALC-MCNC: 3.5 G/DL (ref 1.9–3.5)
GLUCOSE SERPL-MCNC: 86 MG/DL (ref 65–99)
HCT VFR BLD AUTO: 44.6 % (ref 37–47)
HGB BLD-MCNC: 14.9 G/DL (ref 12–16)
MCH RBC QN AUTO: 27 PG (ref 27–33)
MCHC RBC AUTO-ENTMCNC: 33.4 G/DL (ref 33.6–35)
MCV RBC AUTO: 80.8 FL (ref 81.4–97.8)
PLATELET # BLD AUTO: 313 K/UL (ref 164–446)
PMV BLD AUTO: 10.8 FL (ref 9–12.9)
POTASSIUM SERPL-SCNC: 3.6 MMOL/L (ref 3.6–5.5)
PROT SERPL-MCNC: 7.9 G/DL (ref 6–8.2)
RBC # BLD AUTO: 5.52 M/UL (ref 4.2–5.4)
SODIUM SERPL-SCNC: 137 MMOL/L (ref 135–145)
TSH SERPL DL<=0.005 MIU/L-ACNC: 2.45 UIU/ML (ref 0.38–5.33)
WBC # BLD AUTO: 11.6 K/UL (ref 4.8–10.8)

## 2022-12-06 PROCEDURE — 84443 ASSAY THYROID STIM HORMONE: CPT

## 2022-12-06 PROCEDURE — 80053 COMPREHEN METABOLIC PANEL: CPT

## 2022-12-06 PROCEDURE — 36415 COLL VENOUS BLD VENIPUNCTURE: CPT

## 2022-12-06 PROCEDURE — 85027 COMPLETE CBC AUTOMATED: CPT

## 2022-12-21 ENCOUNTER — APPOINTMENT (OUTPATIENT)
Dept: NEUROLOGY | Facility: MEDICAL CENTER | Age: 32
End: 2022-12-21
Attending: PSYCHIATRY & NEUROLOGY
Payer: COMMERCIAL

## 2022-12-29 ENCOUNTER — NON-PROVIDER VISIT (OUTPATIENT)
Dept: NEUROLOGY | Facility: MEDICAL CENTER | Age: 32
End: 2022-12-29
Attending: PSYCHIATRY & NEUROLOGY
Payer: COMMERCIAL

## 2022-12-29 DIAGNOSIS — R20.0 RIGHT ARM NUMBNESS: ICD-10-CM

## 2022-12-29 DIAGNOSIS — R20.0 RIGHT LEG NUMBNESS: ICD-10-CM

## 2022-12-29 PROCEDURE — 95911 NRV CNDJ TEST 9-10 STUDIES: CPT | Mod: 26 | Performed by: PSYCHIATRY & NEUROLOGY

## 2022-12-29 PROCEDURE — 95911 NRV CNDJ TEST 9-10 STUDIES: CPT | Performed by: PSYCHIATRY & NEUROLOGY

## 2022-12-29 PROCEDURE — 95886 MUSC TEST DONE W/N TEST COMP: CPT | Mod: 26 | Performed by: PSYCHIATRY & NEUROLOGY

## 2022-12-29 PROCEDURE — 95886 MUSC TEST DONE W/N TEST COMP: CPT | Performed by: PSYCHIATRY & NEUROLOGY

## 2022-12-29 NOTE — PROCEDURES
"NERVE CONDUCTION STUDIES AND ELECTROMYOGRAPHY REPORT  [facility]  12/29/22          IMPRESSION:  This is a normal study of the right median and ulnar nerves.  There is no electrophysiologic evidence of a length dependent large fiber peripheral neuropathy or right cervical/lumbosacral radiculopathy.      Leslie Rivera MD  Neurology - Neurophysiology              REASON FOR REFERRAL:  Ms. Federica Acuña 32 y.o. referred by Dr. Sy Mehta for evaluation of numbness.  Symptoms have been present for about 8 weeks and are described as numbness and tingling in the right upper and lower extremity lasting for seconds at night.    Height: 5'10\"  Weight: 202 lbs    Symptom focused neurological exam shows normal strength in the bilateral upper and lower extremities.  Sensation to light touch is intact throughout.  Patellar and Achilles reflexes are present and symmetric.      ELECTRODIAGNOSTIC EXAMINATION:  Nerve conduction studies (NCS) and electromyography (EMG) are utilized to evaluate direct or indirect damage to the peripheral nervous system. NCS are performed to measure the nerve(s) response(s) to electrostimulation across a given nerve segment. EMG evaluates the passive and active electrical activity of the muscle(s) in question.  Muscles are innervated by specific peripheral nerves and roots. Often times, several nerves the muscle to be examined in order to determine the presence or absence of the disease process. Furthermore, nerves and muscles may need to be tested in a [id number] comparison, as well as in additional extremities, as this may be crucial in characterizing the extent of the disease process, which may be diffuse or isolated and of varying degree of severity. The extent of the neurodiagnostic exam is justified as it may help arrive to a proper diagnosis, which ultimately may contribute to better management of the patient. Therefore, the nerves to muscles " examined during the study were medically necessary.    Unless otherwise noted, temperature of the extremity(s) study was monitored before and during the examination and remained between 32 and 36 degrees C for the upper extremities, and between 30 and 36 degrees C for the lower extremities. The patient tolerated testing well, without any complications.       NERVE CONDUCTION STUDY SUMMARY:  Selected nerves of the right upper and lower extremity are studied.    Normal right median sensory and motor responses.  Normal right ulnar sensory and motor responses.  Normal right sural sensory response.  Normal right common peroneal motor response.  Normal right tibial motor response.  Normal right median/ulnar palmar comparison.      NEEDLE EMG SUMMARY:  Concentric needle study of selected right upper and lower extremity muscles is performed.     Insertion activity is normal in all muscles sampled.   With activation, there are normal morphology (amplitude/duration) motor unit action potentials firing with normal recruitment in muscles tested.       PATIENT DATA TABLES  Nerve Conduction Studies     Stim Site NR Onset (ms) Norm Onset (ms) O-P Amp (µV) Norm O-P Amp Site1 Site2 Delta-P (ms) Dist (cm) Ozzy (m/s) Norm Ozzy (m/s)   Right Median Anti Sensory (2nd Digit)   Wrist    1.6 <3.4 83.8 >20 Wrist 2nd Digit 2.5 14.0 56 >50   Right Sural Anti Sensory (Lat Mall)   Calf    2.5 <4.5 36.5 >5 Calf Lat Mall 3.3 14.0 42 >40   Right Ulnar Anti Sensory (5th Digit)   Wrist    2.0 <3.1 46.2 >12 Wrist 5th Digit 2.7 14.0 52 >50        Stim Site NR Onset (ms) Norm Onset (ms) O-P Amp (mV) Norm O-P Amp Site1 Site2 Delta-0 (ms) Dist (cm) Ozzy (m/s) Norm Ozzy (m/s)   Right Median Motor (Abd Poll Brev)   Wrist    2.7 <3.9 13.9 >6 Elbow Wrist 3.6 23.0 64 >50   Elbow    6.3  12.4          Right Peroneal EDB Motor (Ext Dig Brev)   Ankle    3.3 <5.5 6.1 >3.0 B Fib Ankle 6.9 34.0 49 >40   B Fib    10.2  5.9  Poplt B Fib 1.1 10.0 91    Poplt    11.3  5.7           Right Tibial Motor (Abd Velasquez Brev)   Ankle    3.9 <6 10.5 >8 Knee Ankle 7.0 40.0 57 >40   Knee    10.9  9.1          Right Ulnar Motor (Abd Dig Min)   Wrist    2.1 <3.1 12.0 >7 B Elbow Wrist 3.1 19.0 61 >50   B Elbow    5.2  11.2  A Elbow B Elbow 1.4 10.0 71    A Elbow    6.6  10.8               Stim Site NR Peak (ms) Norm Peak (ms) P-T Amp (µV) Site1 Site2 Delta-P (ms) Norm Delta (ms)   Right Median/Ulnar Palm Comparison (Wrist - 8cm)   Median Palm    1.4 <2.3 25.6 Median Palm Ulnar Palm 0.0 <0.3   Ulnar Palm    1.4 <2.3 14.3                             Electromyography     Side Muscle Nerve Root Ins Act Fibs Psw Amp Dur Poly Recrt Int Pat Comment   Right AntTibialis Dp Br Fibular L4-5 Nml Nml Nml Nml Nml 0 Nml Nml    Right Gastroc Tibial S1-2 Nml Nml Nml Nml Nml 0 Nml Nml    Right VastusLat Femoral L2-4 Nml Nml Nml Nml Nml 0 Nml Nml    Right GluteusMed SupGluteal L5-S1 Nml Nml Nml Nml Nml 0 Nml Nml    Right Lumbo Parasp Low Rami L5-S1 Nml Nml Nml         Right 1stDorInt Ulnar C8-T1 Nml Nml Nml Nml Nml 0 Nml Nml    Right PronatorTeres Median C6-7 Nml Nml Nml Nml Nml 0 Nml Nml    Right Biceps Musculocut C5-6 Nml Nml Nml Nml Nml 0 Nml Nml    Right Triceps Radial C6-7-8 Nml Nml Nml Nml Nml 0 Nml Nml    Right Deltoid Axillary C5-6 Nml Nml Nml Nml Nml 0 Nml Nml

## 2023-01-02 ENCOUNTER — OFFICE VISIT (OUTPATIENT)
Dept: URGENT CARE | Facility: PHYSICIAN GROUP | Age: 33
End: 2023-01-02
Payer: COMMERCIAL

## 2023-01-02 ENCOUNTER — HOSPITAL ENCOUNTER (OUTPATIENT)
Dept: RADIOLOGY | Facility: MEDICAL CENTER | Age: 33
End: 2023-01-02
Attending: NURSE PRACTITIONER
Payer: COMMERCIAL

## 2023-01-02 VITALS
HEIGHT: 70 IN | BODY MASS INDEX: 29.2 KG/M2 | RESPIRATION RATE: 16 BRPM | DIASTOLIC BLOOD PRESSURE: 64 MMHG | HEART RATE: 87 BPM | WEIGHT: 204 LBS | OXYGEN SATURATION: 99 % | SYSTOLIC BLOOD PRESSURE: 114 MMHG | TEMPERATURE: 99 F

## 2023-01-02 DIAGNOSIS — R20.2 PARESTHESIA OF LEFT ARM: ICD-10-CM

## 2023-01-02 DIAGNOSIS — R07.9 CHEST PAIN, UNSPECIFIED TYPE: ICD-10-CM

## 2023-01-02 PROCEDURE — 93000 ELECTROCARDIOGRAM COMPLETE: CPT | Performed by: NURSE PRACTITIONER

## 2023-01-02 PROCEDURE — 71046 X-RAY EXAM CHEST 2 VIEWS: CPT

## 2023-01-02 PROCEDURE — 99214 OFFICE O/P EST MOD 30 MIN: CPT | Performed by: NURSE PRACTITIONER

## 2023-01-02 ASSESSMENT — FIBROSIS 4 INDEX: FIB4 SCORE: 0.47

## 2023-01-02 NOTE — PROGRESS NOTES
Subjective:     Federica Acuña is a 32 y.o. female who presents for Foot Problem (L foot numb since Saturday, L arm numbness, slight chest pressure off and on since last night)      Foot Problem  Associated symptoms include chest pain. Pertinent negatives include no abdominal pain, coughing, nausea, vomiting or weakness.   Pt presents for evaluation of a new problem. Federica is a pleasant 32-year-old female presents to urgent care today with complaints of left second third and fourth lower extremity digit numbness and tingling that started approximately 3 days ago.  She states that prior to her symptoms beginning she was wearing high heels which she is not used to doing.  She notes that she was wearing high heels for approximately 6 hours as it was her birthday.  Her symptoms are progressively starting to improve.  She additionally complains of left upper extremity arm numbness and tingling that is been ongoing for the past 2 days.  She states that this has been an ongoing issue however, this is normally in her right upper extremity.  She has had a nerve conduction test performed which was completely benign.  She states that she was drinking heavily this last weekend due to her 32th birthday and prior episodes of drinking has caused numbness and tingling in her extremities.  She is concerned however as this is never occurred in her left upper extremity and she is also endorsing mild chest pain.  This pain is worse with movement and with palpation.  She denies any shortness of breath, nausea, vomiting, headaches, jaw pain or back pain.  No family history of cardiac disorders.    Review of Systems   Respiratory:  Negative for cough and shortness of breath.    Cardiovascular:  Positive for chest pain.   Gastrointestinal:  Negative for abdominal pain, nausea and vomiting.   Neurological:  Positive for tingling and sensory change. Negative for focal weakness and weakness.     PMH:   Past Medical History:  "  Diagnosis Date    Dyslipidemia 4/11/2018    Pyloric stenosis      ALLERGIES: No Known Allergies  SURGHX:   Past Surgical History:   Procedure Laterality Date    ABDOMINAL EXPLORATION      pyloric stenosis surgery     SOCHX:   Social History     Socioeconomic History    Marital status:    Tobacco Use    Smoking status: Never    Smokeless tobacco: Never   Vaping Use    Vaping Use: Never used   Substance and Sexual Activity    Alcohol use: Yes    Drug use: No    Sexual activity: Yes     Partners: Male     Birth control/protection: Pill     FH:   Family History   Problem Relation Age of Onset    Hypertension Mother     Hyperlipidemia Mother     Hypertension Father     Cancer Maternal Grandmother 27        breast         Objective:   /64   Pulse 87   Temp 37.2 °C (99 °F)   Resp 16   Ht 1.778 m (5' 10\")   Wt 92.5 kg (204 lb)   SpO2 99%   BMI 29.27 kg/m²     Physical Exam  Vitals and nursing note reviewed.   Constitutional:       General: She is not in acute distress.     Appearance: Normal appearance. She is not ill-appearing.   HENT:      Head: Normocephalic and atraumatic.      Right Ear: External ear normal.      Left Ear: External ear normal.      Nose: No congestion or rhinorrhea.      Mouth/Throat:      Mouth: Mucous membranes are moist.   Eyes:      Extraocular Movements: Extraocular movements intact.      Pupils: Pupils are equal, round, and reactive to light.   Cardiovascular:      Rate and Rhythm: Normal rate and regular rhythm.      Pulses:           Radial pulses are 2+ on the right side and 2+ on the left side.        Dorsalis pedis pulses are 2+ on the right side and 2+ on the left side.      Heart sounds: Normal heart sounds. No murmur heard.     Comments: Positive for tenderness with palpation to anterior chest wall.  Pulmonary:      Effort: Pulmonary effort is normal. No respiratory distress.      Breath sounds: Normal breath sounds. No stridor. No wheezing, rhonchi or rales. "   Chest:      Chest wall: No tenderness.   Abdominal:      General: Abdomen is flat. Bowel sounds are normal.      Palpations: Abdomen is soft.      Tenderness: There is abdominal tenderness. There is no right CVA tenderness or left CVA tenderness.   Musculoskeletal:         General: Normal range of motion.      Left upper arm: No swelling, edema, deformity, lacerations, tenderness or bony tenderness.      Cervical back: Normal range of motion and neck supple.      Right lower leg: No edema.      Left lower leg: No edema.      Comments: CMS intact of digits of left foot.  Negative for ecchymosis.  Digits are warm to touch.  Cap refill less than 2 seconds.     Left upper extremity range of motion intact.  Strength of bilateral upper extremities 5/5.  Bilateral radial pulses +2.  Cap refill less than 3 seconds.   Skin:     General: Skin is warm and dry.      Capillary Refill: Capillary refill takes less than 2 seconds.   Neurological:      General: No focal deficit present.      Mental Status: She is alert and oriented to person, place, and time. Mental status is at baseline.   Psychiatric:         Mood and Affect: Mood normal.         Behavior: Behavior normal.         Thought Content: Thought content normal.         Judgment: Judgment normal.     DX-CHEST-2 VIEWS    Result Date: 1/2/2023 1/2/2023 3:30 PM HISTORY/REASON FOR EXAM:  Chest Pain TECHNIQUE/EXAM DESCRIPTION AND NUMBER OF VIEWS: Two views of the chest. COMPARISON:  None available. FINDINGS: The mediastinal and cardiac silhouette is unremarkable. The pulmonary vascularity is within normal limits. The lung parenchyma is clear. There is no significant pleural effusion. There is no visible pneumothorax. There are no acute bony abnormalities.     1.  Unremarkable two view chest.      ECG: Rate of 77; normal sinus rhythm  Assessment/Plan:   Assessment    1. Chest pain, unspecified type  EKG - Clinic Performed    DX-CHEST-2 VIEWS      2. Paresthesia of left arm   EKG - Clinic Performed    DX-CHEST-2 VIEWS        X-ray results discussed with patient.  No abnormal findings.  Her right upper extremity arm tingling is improving in the clinic today.  Her altered sensation in her toes are also progressively improving since initial presentation 2 days ago.  I do believe that this is secondary to wearing high heels for an extended period of time.  She has been suffering from ongoing paresthesias of right upper extremity and it is possible that this is now moved over to her left upper extremity.  Differentials of chest pain discussed with patient.  She is in no distress in the clinic today.  I did give her very strict ER precautions for worsening or persistent symptoms.  She is in agreement with her plan of care today.   Pt educated on red flags and when to seek treatment back in ER or UC.

## 2023-01-03 ENCOUNTER — HOSPITAL ENCOUNTER (EMERGENCY)
Facility: MEDICAL CENTER | Age: 33
End: 2023-01-03
Attending: EMERGENCY MEDICINE
Payer: COMMERCIAL

## 2023-01-03 VITALS
SYSTOLIC BLOOD PRESSURE: 123 MMHG | DIASTOLIC BLOOD PRESSURE: 83 MMHG | HEIGHT: 69 IN | RESPIRATION RATE: 16 BRPM | WEIGHT: 202.82 LBS | TEMPERATURE: 97.6 F | HEART RATE: 76 BPM | OXYGEN SATURATION: 99 % | BODY MASS INDEX: 30.04 KG/M2

## 2023-01-03 DIAGNOSIS — R07.9 CHEST PAIN, UNSPECIFIED TYPE: ICD-10-CM

## 2023-01-03 LAB
ALBUMIN SERPL BCP-MCNC: 4 G/DL (ref 3.2–4.9)
ALBUMIN/GLOB SERPL: 1.3 G/DL
ALP SERPL-CCNC: 63 U/L (ref 30–99)
ALT SERPL-CCNC: 29 U/L (ref 2–50)
ANION GAP SERPL CALC-SCNC: 13 MMOL/L (ref 7–16)
AST SERPL-CCNC: 32 U/L (ref 12–45)
BASOPHILS # BLD AUTO: 1.1 % (ref 0–1.8)
BASOPHILS # BLD: 0.1 K/UL (ref 0–0.12)
BILIRUB SERPL-MCNC: 0.3 MG/DL (ref 0.1–1.5)
BUN SERPL-MCNC: 13 MG/DL (ref 8–22)
CALCIUM ALBUM COR SERPL-MCNC: 9.5 MG/DL (ref 8.5–10.5)
CALCIUM SERPL-MCNC: 9.5 MG/DL (ref 8.5–10.5)
CHLORIDE SERPL-SCNC: 103 MMOL/L (ref 96–112)
CO2 SERPL-SCNC: 18 MMOL/L (ref 20–33)
CREAT SERPL-MCNC: 0.74 MG/DL (ref 0.5–1.4)
EKG IMPRESSION: NORMAL
EOSINOPHIL # BLD AUTO: 0.06 K/UL (ref 0–0.51)
EOSINOPHIL NFR BLD: 0.7 % (ref 0–6.9)
ERYTHROCYTE [DISTWIDTH] IN BLOOD BY AUTOMATED COUNT: 38.5 FL (ref 35.9–50)
GFR SERPLBLD CREATININE-BSD FMLA CKD-EPI: 112 ML/MIN/1.73 M 2
GLOBULIN SER CALC-MCNC: 3.1 G/DL (ref 1.9–3.5)
GLUCOSE SERPL-MCNC: 104 MG/DL (ref 65–99)
HCG SERPL QL: NEGATIVE
HCT VFR BLD AUTO: 44.6 % (ref 37–47)
HGB BLD-MCNC: 14.7 G/DL (ref 12–16)
IMM GRANULOCYTES # BLD AUTO: 0.03 K/UL (ref 0–0.11)
IMM GRANULOCYTES NFR BLD AUTO: 0.3 % (ref 0–0.9)
LIPASE SERPL-CCNC: 32 U/L (ref 11–82)
LYMPHOCYTES # BLD AUTO: 2.48 K/UL (ref 1–4.8)
LYMPHOCYTES NFR BLD: 28.4 % (ref 22–41)
MCH RBC QN AUTO: 26.9 PG (ref 27–33)
MCHC RBC AUTO-ENTMCNC: 33 G/DL (ref 33.6–35)
MCV RBC AUTO: 81.7 FL (ref 81.4–97.8)
MONOCYTES # BLD AUTO: 0.46 K/UL (ref 0–0.85)
MONOCYTES NFR BLD AUTO: 5.3 % (ref 0–13.4)
NEUTROPHILS # BLD AUTO: 5.59 K/UL (ref 2–7.15)
NEUTROPHILS NFR BLD: 64.2 % (ref 44–72)
NRBC # BLD AUTO: 0 K/UL
NRBC BLD-RTO: 0 /100 WBC
PLATELET # BLD AUTO: 286 K/UL (ref 164–446)
PMV BLD AUTO: 10.9 FL (ref 9–12.9)
POTASSIUM SERPL-SCNC: 4 MMOL/L (ref 3.6–5.5)
PROT SERPL-MCNC: 7.1 G/DL (ref 6–8.2)
RBC # BLD AUTO: 5.46 M/UL (ref 4.2–5.4)
SODIUM SERPL-SCNC: 134 MMOL/L (ref 135–145)
TROPONIN T SERPL-MCNC: <6 NG/L (ref 6–19)
WBC # BLD AUTO: 8.7 K/UL (ref 4.8–10.8)

## 2023-01-03 PROCEDURE — 83690 ASSAY OF LIPASE: CPT

## 2023-01-03 PROCEDURE — 93005 ELECTROCARDIOGRAM TRACING: CPT | Performed by: EMERGENCY MEDICINE

## 2023-01-03 PROCEDURE — 84703 CHORIONIC GONADOTROPIN ASSAY: CPT

## 2023-01-03 PROCEDURE — 84484 ASSAY OF TROPONIN QUANT: CPT

## 2023-01-03 PROCEDURE — 85025 COMPLETE CBC W/AUTO DIFF WBC: CPT

## 2023-01-03 PROCEDURE — 36415 COLL VENOUS BLD VENIPUNCTURE: CPT

## 2023-01-03 PROCEDURE — 93005 ELECTROCARDIOGRAM TRACING: CPT

## 2023-01-03 PROCEDURE — 80053 COMPREHEN METABOLIC PANEL: CPT

## 2023-01-03 PROCEDURE — 99283 EMERGENCY DEPT VISIT LOW MDM: CPT

## 2023-01-03 ASSESSMENT — ENCOUNTER SYMPTOMS
TINGLING: 1
ABDOMINAL PAIN: 0
WEAKNESS: 0
NAUSEA: 0
SENSORY CHANGE: 1
SHORTNESS OF BREATH: 0
VOMITING: 0
FOCAL WEAKNESS: 0
COUGH: 0

## 2023-01-03 ASSESSMENT — FIBROSIS 4 INDEX: FIB4 SCORE: 0.47

## 2023-01-03 NOTE — ED TRIAGE NOTES
"Chief Complaint   Patient presents with    Chest Pressure     Since yesterday. Seen at urgent care with no significant findings. Denies radiation of pain. Endorses associated left sided neck tingling.     Tingling     Left arm tingling and numbness. Hx of similar complaints to right side in November. Patient states tingling now \"just feels like a soreness\" to forearm, shoulder, and hand. No neuro deficits noted.        "

## 2023-01-03 NOTE — ED PROVIDER NOTES
"ED Provider Note        CHIEF COMPLAINT  Chief Complaint   Patient presents with    Chest Pressure     Since yesterday. Seen at urgent care with no significant findings. Denies radiation of pain. Endorses associated left sided neck tingling.     Tingling     Left arm tingling and numbness. Hx of similar complaints to right side in November. Patient states tingling now \"just feels like a soreness\" to forearm, shoulder, and hand. No neuro deficits noted.        EXTERNAL RECORDS REVIEWED  Select: Outpatient Notes From urgent care yesterday    HPI  LIMITATION TO HISTORY   Select: : None  OUTSIDE HISTORIAN(S):  Select: N/a    Federica Acuña is a 32 y.o. female who presents with left arm \"soreness\".  Had some left arm tingling yesterday for which she went to the urgent care.  She states that she had EKG and chest x-ray done which were unremarkable and was discharged with return precautions.  No fevers or cough.  States that she has some dull chest discomfort    Patient does mention that on Friday night she drank heavily for the first time in a very long time and proceeded to vomit about 4 times early Saturday morning as a result.  No abdominal pain.    REVIEW OF SYSTEMS  See HPI for further details. All other systems are negative.     PAST MEDICAL HISTORY   has a past medical history of Dyslipidemia (4/11/2018) and Pyloric stenosis.    SURGICAL HISTORY   has a past surgical history that includes abdominal exploration.    FAMILY HISTORY  Family History   Problem Relation Age of Onset    Hypertension Mother     Hyperlipidemia Mother     Hypertension Father     Cancer Maternal Grandmother 27        breast       SOCIAL HISTORY  Social History     Tobacco Use    Smoking status: Never    Smokeless tobacco: Never   Vaping Use    Vaping Use: Never used   Substance and Sexual Activity    Alcohol use: Yes    Drug use: No    Sexual activity: Yes     Partners: Male     Birth control/protection: Pill       CURRENT " "MEDICATIONS  Home Medications       Reviewed by Debora Curran R.N. (Registered Nurse) on 01/03/23 at 0632  Med List Status: Partial     Medication Last Dose Status   lisinopril-hydrochlorothiazide (PRINZIDE) 10-12.5 MG per tablet  Active   SPRINTEC 28 0.25-35 MG-MCG per tablet  Active                    ALLERGIES  No Known Allergies    PHYSICAL EXAM  VITAL SIGNS: BP (!) 143/99   Pulse 79   Temp 36.1 °C (96.9 °F) (Temporal)   Resp 16   Ht 1.753 m (5' 9\")   Wt 92 kg (202 lb 13.2 oz)   SpO2 100%   BMI 29.95 kg/m²    Constitutional: Alert in no apparent distress.  HENT: No signs of trauma, Bilateral external ears normal, Nose normal.   Eyes: Pupils are equal and reactive, Conjunctiva normal, Non-icteric.   Neck: Normal range of motion, No tenderness, Supple, No stridor.   Lymphatic: No lymphadenopathy noted.   Cardiovascular: Regular rate and rhythm, no murmurs.   Thorax & Lungs: Normal breath sounds, No respiratory distress, No wheezing, No chest tenderness.   Abdomen: Bowel sounds normal, Soft, No tenderness, No peritoneal signs, No masses, No pulsatile masses.   Skin: Warm, Dry, No erythema, No rash.   Back: No bony tenderness, No CVA tenderness.   Extremities: Intact distal pulses, No edema, No tenderness, No cyanosis,  Negative Patty's sign.   Musculoskeletal: Good range of motion in all major joints. No tenderness to palpation or major deformities noted.   Neurologic: Alert , Normal motor function, Normal sensory function, No focal deficits noted.   Psychiatric: Affect normal, Judgment normal, Mood normal.       DIAGNOSTIC STUDIES / PROCEDURES  EKG  Results for orders placed or performed during the hospital encounter of 01/03/23   EKG (NOW)   Result Value Ref Range    Report       [facility].    Test Date:  2023-01-03  Pt Name:    DARRION MAHMOOD              Department: ER  MRN:        [id number]                      Room:  Gender:     Female                       " Technician: [id number]  :        1990                   Requested By:ER TRIAGE PROTOCOL  Order #:    [id number]                    Reading MD: EMETERIO PRADO MD    Measurements  Intervals                                Axis  Rate:       81                           P:          35  MI:         136                          QRS:        43  QRSD:       84                           T:          73  QT:         380  QTc:        441    Interpretive Statements  Sinus rhythm  No previous ECG available for comparison  Electronically Signed On 1-3-2023 7:59:01 PST by EMETERIO PRADO MD         LABS  Labs Reviewed   CBC WITH DIFFERENTIAL - Abnormal; Notable for the following components:       Result Value    RBC 5.46 (*)     MCH 26.9 (*)     MCHC 33.0 (*)     All other components within normal limits    Narrative:     Biotin intake of greater than 5 mg per day may interfere with  troponin levels, causing false low values.   COMP METABOLIC PANEL - Abnormal; Notable for the following components:    Sodium 134 (*)     Co2 18 (*)     Glucose 104 (*)     All other components within normal limits    Narrative:     Biotin intake of greater than 5 mg per day may interfere with  troponin levels, causing false low values.   TROPONIN    Narrative:     Biotin intake of greater than 5 mg per day may interfere with  troponin levels, causing false low values.   LIPASE    Narrative:     Biotin intake of greater than 5 mg per day may interfere with  troponin levels, causing false low values.   HCG QUAL SERUM    Narrative:     Biotin intake of greater than 5 mg per day may interfere with  troponin levels, causing false low values.   CORRECTED CALCIUM    Narrative:     Biotin intake of greater than 5 mg per day may interfere with  troponin levels, causing false low values.   ESTIMATED GFR    Narrative:     Biotin intake of greater than 5 mg per day may interfere with  troponin levels, causing false low values.         RADIOLOGY  I have independently  interpreted the diagnostic imaging associated with this visit and am waiting the final reading from the radiologist.   No orders to display       COURSE & MEDICAL DECISION MAKING  Pertinent Labs & Imaging studies reviewed. (See chart for details)    ED Observation Status? No; Patient does not meet criteria for ED Observation.     INITIAL ASSESSMENT AND PLAN  32 y.o. female presenting with chest pain since yesterday.  Described as pressure sensation.  Also has some slight numbness to the left upper extremity described as tingling.  Denies any weakness to the left upper extremity.  A few months ago she also had tingling to the right upper and lower extremity that has since resolved though has been worked up in the past with EMG.    Patient denies known cardiac history.  She does admit that she drank excessively a few days ago and resulted in about 4 episodes of vomiting on Saturday.    Laboratory studies were performed.  Patient has a heart score of 0.  Negative troponin.  EKG is unremarkable.  No signs of acute ischemia.  Laboratory studies are rather reassuring.  No evidence of pancreatitis.  Chest x-ray was performed yesterday at urgent care and I did review these images.  It was found to be unremarkable.  I do not believe repeat x-ray imaging is indicated at this time.  Given the unremarkable work-up at this time with unremarkable physical exam and normal vital signs, I suspect that this may be related to esophagitis or reflux as a result of vomiting recently.    Low suspicion for more serious condition such as aortic dissection or pulmonary embolism or ACS.  Pulmonary embolism rule out criteria negative.    I did review all results with the patient and recommended outpatient follow-up.  She is agreeable with the outpatient plan.    With regards to the patient's left arm tingling, this is being addressed as an outpatient.  She has a follow-up visit on Friday.  No appreciable weakness or motor deficit noticed.  No  significant neck pain.  Unclear etiology at this time.  Normal metabolic function on laboratory studies.    @HTN/IDDM FOLLOW UP:  The patient is referred to a primary physician for blood pressure management, diabetic screening, and for all other preventive health concerns@     Escalation of care considered, and ultimately not performed: N/A    Barriers to care at this time, including but not limited to: Select: N/A .     Diagnostic tests and prescription drugs considered including, but not limited to: Select: PERC rule negative and HEART Score 0 .           FINAL PROBLEM LIST AND DISPOSITION  Chest pain  Left arm tingling      Discussion of management with other QHP or appropriate source(s): Select: None     The patient will not drink alcohol nor drive with prescribed medications. The patient will return for worsening symptoms and is stable at the time of discharge. The patient verbalizes understanding and will comply.    FINAL IMPRESSION  1. Chest pain, unspecified type           Electronically signed by: Rao Yoo M.D., 1/3/2023 7:57 AM

## 2023-01-03 NOTE — ED NOTES
EKG obtained and signed by Dr. Yoo. Patient significant other present in room while obtaining EKG.    Pt hypotensive Pt complaining of 10/10 epigastric pain Pt refusing lovenox patient getting agitated and refusing morning medications and bloodwork Patient refusing night time medications Pt bladder scan showing 266ml and q6FS 91 patient refusing bedtime fingerstick and meds Confirming whether or not patient needs telemetry monitoring Pt FS 87 Pt had a 92.9F Rectal temp Pt rectal temp 95F. Vanco Troph 19.6 with AM labs

## 2023-01-06 ENCOUNTER — OFFICE VISIT (OUTPATIENT)
Dept: MEDICAL GROUP | Facility: LAB | Age: 33
End: 2023-01-06
Payer: COMMERCIAL

## 2023-01-06 VITALS
OXYGEN SATURATION: 97 % | RESPIRATION RATE: 14 BRPM | BODY MASS INDEX: 29.62 KG/M2 | HEIGHT: 69 IN | DIASTOLIC BLOOD PRESSURE: 76 MMHG | SYSTOLIC BLOOD PRESSURE: 114 MMHG | HEART RATE: 75 BPM | TEMPERATURE: 98 F | WEIGHT: 200 LBS

## 2023-01-06 DIAGNOSIS — K21.9 GASTROESOPHAGEAL REFLUX DISEASE, UNSPECIFIED WHETHER ESOPHAGITIS PRESENT: ICD-10-CM

## 2023-01-06 DIAGNOSIS — Z23 NEED FOR VACCINATION: ICD-10-CM

## 2023-01-06 PROCEDURE — 99214 OFFICE O/P EST MOD 30 MIN: CPT | Mod: 25 | Performed by: FAMILY MEDICINE

## 2023-01-06 PROCEDURE — 90686 IIV4 VACC NO PRSV 0.5 ML IM: CPT | Performed by: FAMILY MEDICINE

## 2023-01-06 PROCEDURE — 90471 IMMUNIZATION ADMIN: CPT | Performed by: FAMILY MEDICINE

## 2023-01-06 RX ORDER — OMEPRAZOLE 40 MG/1
40 CAPSULE, DELAYED RELEASE ORAL DAILY
Qty: 30 CAPSULE | Refills: 1 | Status: SHIPPED | OUTPATIENT
Start: 2023-01-06 | End: 2023-01-23

## 2023-01-06 ASSESSMENT — PATIENT HEALTH QUESTIONNAIRE - PHQ9: CLINICAL INTERPRETATION OF PHQ2 SCORE: 0

## 2023-01-06 ASSESSMENT — FIBROSIS 4 INDEX: FIB4 SCORE: 0.62

## 2023-01-06 NOTE — PROGRESS NOTES
"Subjective:   Federica Acuña is a 32 y.o. female here today for   Chief Complaint   Patient presents with    Follow-Up     Urgent care follow-up, EMG results, not fully resolved with chest pressure while eating.       Gastrophageal Reflux       Chest pain:  -Patient recently seen emergency department on 1/3/2023 after experiencing chest pain.  Full cardiac work-up was completed, negative.  -Patient states she continues to have some chest pain.  Describes it as a burning sensation in the middle of her chest.  She also has associated globus sensation in the back of the throat which is present especially after eating.  -Occasion will develop some hoarseness, cough at the end of the day.  Denies any metallic or sour taste in the mouth.  -She states she had previous symptoms remotely although they did resolve after diet and exercising and losing weight.  -Patient states that recently has been extremely stressful as they are looking at moving into a new house.-Patient denies any difficulty swallowing, pain with swallowing, postprandial fullness pain, abdominal pain, nausea, vomiting, blood in stool, black dark or tarry stools.      No Known Allergies      Current medicines (including changes today)  Current Outpatient Medications   Medication Sig Dispense Refill    omeprazole (PRILOSEC) 40 MG delayed-release capsule Take 1 Capsule by mouth every day for 30 days. 30 Capsule 1    SPRINTEC 28 0.25-35 MG-MCG per tablet TAKE 1 TABLET BY MOUTH  DAILY 84 Tablet 3    lisinopril-hydrochlorothiazide (PRINZIDE) 10-12.5 MG per tablet TAKE 1 TABLET BY MOUTH  DAILY 90 Tablet 3     No current facility-administered medications for this visit.     She  has a past medical history of Dyslipidemia (4/11/2018) and Pyloric stenosis.    ROS   -See HPI     Objective:     Physical Exam:  /76   Pulse 75   Temp 36.7 °C (98 °F) (Temporal)   Resp 14   Ht 1.753 m (5' 9.02\")   Wt 90.7 kg (200 lb)   SpO2 97%  Body mass index is " 29.52 kg/m².   Constitutional: Alert, no distress.  Skin: Warm, dry, good turgor, no rashes in visible areas.  Eye: Equal, round and reactive, conjunctiva clear, lids normal.  ENMT: lips without lesions, good dentition, oropharynx clear.  Respiratory: Unlabored respiratory effort  Abdomen: Soft, non-tender, no masses, no hepatosplenomegaly.  Psych: Alert and oriented x3, normal affect and mood.    Assessment and Plan:     1. Gastroesophageal reflux disease, unspecified whether esophagitis present  -new diagnosis at this time, most likely causing exacerbated by increased stress in her life as well as changes in her diet.  We did discuss conservative treatment measures with watching and avoiding spicy foods, fatty foods, high acidic foods.  Discussed avoiding large meals especially at the end of the day.  We will also begin treatment this time with omeprazole 40 mg daily.  Patient will continue medication for the next 1 to 2 months.  -No red flag symptoms at this time, no concerns for any gastritis or bleeds.  No real travel outside the [place], no postprandial fullness making H. pylori low on the differential.  We will begin treatment, patient will continue monitoring diet follow-up in 1 month for medication and symptom check.  - omeprazole (PRILOSEC) 40 MG delayed-release capsule; Take 1 Capsule by mouth every day for 30 days.  Dispense: 30 Capsule; Refill: 1    2. Need for vaccination  -Patient was agreeable to receiving the 01/06/2023 vaccine in clinic today after discussion of potential risks, benefits, and side effects. Vaccine was administered without adverse effects..  - INFLUENZA VACCINE QUAD INJ (PF)      Followup: Return in about 4 weeks (around 2/3/2023).         PLEASE NOTE: This dictation was created using voice recognition software. I have made every reasonable attempt to correct obvious errors, but I expect that there are errors of grammar and possibly content that I did not discover before  finalizing the note.

## 2023-01-06 NOTE — PROGRESS NOTES
"Subjective:   Federica Acuña is a 32 y.o. female here today for   Chief Complaint   Patient presents with    Follow-Up     Urgent care follow-up, EMG results, not fully resolved with chest pressure while eating.       Gastrophageal Reflux       #Chest pain   -  -      Geodis      ***    No Known Allergies      Current medicines (including changes today)  Current Outpatient Medications   Medication Sig Dispense Refill    SPRINTEC 28 0.25-35 MG-MCG per tablet TAKE 1 TABLET BY MOUTH  DAILY 84 Tablet 3    lisinopril-hydrochlorothiazide (PRINZIDE) 10-12.5 MG per tablet TAKE 1 TABLET BY MOUTH  DAILY 90 Tablet 3     No current facility-administered medications for this visit.     She  has a past medical history of Dyslipidemia (4/11/2018) and Pyloric stenosis.    ROS   ROS       Objective:     Physical Exam:  /76   Pulse 75   Temp 36.7 °C (98 °F) (Temporal)   Resp 14   Ht 1.753 m (5' 9.02\")   Wt 90.7 kg (200 lb)   SpO2 97%  Body mass index is 29.52 kg/m². ***  Constitutional: Alert, no distress.  Skin: Warm, dry, good turgor, no rashes in visible areas.  Eye: Equal, round and reactive, conjunctiva clear, lids normal.  ENMT: TM's clear bilaterally, lips without lesions, good dentition, oropharynx clear.  Neck: Trachea midline, no masses, no thyromegaly. No cervical or supraclavicular lymphadenopathy.  Respiratory: Unlabored respiratory effort, lungs clear to auscultation, no wheezes, no rhonchi.  Cardiovascular: Normal S1, S2, no murmur, no edema.  Abdomen: Soft, non-tender, no masses, no hepatosplenomegaly.  Psych: Alert and oriented x3, normal affect and mood.    Assessment and Plan:     There are no diagnoses linked to this encounter.    Followup: No follow-ups on file.         PLEASE NOTE: This dictation was created using voice recognition software. I have made every reasonable attempt to correct obvious errors, but I expect that there are errors of grammar and possibly content that I did not " discover before finalizing the note.

## 2023-01-23 DIAGNOSIS — K21.9 GASTROESOPHAGEAL REFLUX DISEASE, UNSPECIFIED WHETHER ESOPHAGITIS PRESENT: ICD-10-CM

## 2023-01-23 RX ORDER — OMEPRAZOLE 40 MG/1
CAPSULE, DELAYED RELEASE ORAL
Qty: 90 CAPSULE | Refills: 1 | Status: SHIPPED | OUTPATIENT
Start: 2023-01-23 | End: 2023-08-31

## 2023-02-02 DIAGNOSIS — Z30.41 ENCOUNTER FOR SURVEILLANCE OF CONTRACEPTIVE PILLS: ICD-10-CM

## 2023-02-03 NOTE — TELEPHONE ENCOUNTER
Received request via: Pharmacy    Was the patient seen in the last year in this department? Yes    Does the patient have an active prescription (recently filled or refills available) for medication(s) requested? No    Does the patient have correction Plus and need 100 day supply (blood pressure, diabetes and cholesterol meds only)? Patient does not have SCP    Sprintec 28 0.25-35 MG-MCG Oral Tablet

## 2023-02-19 DIAGNOSIS — I10 ESSENTIAL HYPERTENSION: ICD-10-CM

## 2023-02-21 RX ORDER — LISINOPRIL AND HYDROCHLOROTHIAZIDE 12.5; 1 MG/1; MG/1
TABLET ORAL
Qty: 90 TABLET | Refills: 3 | Status: SHIPPED | OUTPATIENT
Start: 2023-02-21 | End: 2023-11-01

## 2023-02-21 NOTE — TELEPHONE ENCOUNTER
Received request via: Pharmacy    Was the patient seen in the last year in this department? Yes  1/6/23  Does the patient have an active prescription (recently filled or refills available) for medication(s) requested? No    Does the patient have prison Plus and need 100 day supply (blood pressure, diabetes and cholesterol meds only)? Patient does not have SCP

## 2023-08-31 ENCOUNTER — OFFICE VISIT (OUTPATIENT)
Dept: MEDICAL GROUP | Facility: LAB | Age: 33
End: 2023-08-31
Payer: COMMERCIAL

## 2023-08-31 ENCOUNTER — HOSPITAL ENCOUNTER (OUTPATIENT)
Dept: LAB | Facility: MEDICAL CENTER | Age: 33
End: 2023-08-31
Attending: FAMILY MEDICINE
Payer: COMMERCIAL

## 2023-08-31 VITALS
BODY MASS INDEX: 29.62 KG/M2 | RESPIRATION RATE: 15 BRPM | HEIGHT: 69 IN | SYSTOLIC BLOOD PRESSURE: 108 MMHG | WEIGHT: 200 LBS | TEMPERATURE: 97.6 F | HEART RATE: 100 BPM | OXYGEN SATURATION: 98 % | DIASTOLIC BLOOD PRESSURE: 78 MMHG

## 2023-08-31 DIAGNOSIS — R42 DIZZINESS: ICD-10-CM

## 2023-08-31 DIAGNOSIS — R53.83 OTHER FATIGUE: ICD-10-CM

## 2023-08-31 DIAGNOSIS — I10 ESSENTIAL HYPERTENSION: ICD-10-CM

## 2023-08-31 DIAGNOSIS — E66.3 OVERWEIGHT: ICD-10-CM

## 2023-08-31 LAB
ALBUMIN SERPL BCP-MCNC: 4.4 G/DL (ref 3.2–4.9)
ALBUMIN/GLOB SERPL: 1.5 G/DL
ALP SERPL-CCNC: 67 U/L (ref 30–99)
ALT SERPL-CCNC: 31 U/L (ref 2–50)
ANION GAP SERPL CALC-SCNC: 12 MMOL/L (ref 7–16)
AST SERPL-CCNC: 29 U/L (ref 12–45)
BILIRUB SERPL-MCNC: 0.3 MG/DL (ref 0.1–1.5)
BUN SERPL-MCNC: 11 MG/DL (ref 8–22)
CALCIUM ALBUM COR SERPL-MCNC: 9.1 MG/DL (ref 8.5–10.5)
CALCIUM SERPL-MCNC: 9.4 MG/DL (ref 8.5–10.5)
CHLORIDE SERPL-SCNC: 105 MMOL/L (ref 96–112)
CO2 SERPL-SCNC: 23 MMOL/L (ref 20–33)
CREAT SERPL-MCNC: 0.81 MG/DL (ref 0.5–1.4)
ERYTHROCYTE [DISTWIDTH] IN BLOOD BY AUTOMATED COUNT: 38.4 FL (ref 35.9–50)
FASTING STATUS PATIENT QL REPORTED: NORMAL
GFR SERPLBLD CREATININE-BSD FMLA CKD-EPI: 100 ML/MIN/1.73 M 2
GLOBULIN SER CALC-MCNC: 3 G/DL (ref 1.9–3.5)
GLUCOSE SERPL-MCNC: 92 MG/DL (ref 65–99)
HCG SERPL QL: NEGATIVE
HCT VFR BLD AUTO: 44.1 % (ref 37–47)
HGB BLD-MCNC: 14.5 G/DL (ref 12–16)
MCH RBC QN AUTO: 26.7 PG (ref 27–33)
MCHC RBC AUTO-ENTMCNC: 32.9 G/DL (ref 32.2–35.5)
MCV RBC AUTO: 81.2 FL (ref 81.4–97.8)
PLATELET # BLD AUTO: 327 K/UL (ref 164–446)
PMV BLD AUTO: 11.8 FL (ref 9–12.9)
POTASSIUM SERPL-SCNC: 4 MMOL/L (ref 3.6–5.5)
PROT SERPL-MCNC: 7.4 G/DL (ref 6–8.2)
RBC # BLD AUTO: 5.43 M/UL (ref 4.2–5.4)
SODIUM SERPL-SCNC: 140 MMOL/L (ref 135–145)
TSH SERPL DL<=0.005 MIU/L-ACNC: 1.94 UIU/ML (ref 0.38–5.33)
WBC # BLD AUTO: 8.2 K/UL (ref 4.8–10.8)

## 2023-08-31 PROCEDURE — 3078F DIAST BP <80 MM HG: CPT | Performed by: FAMILY MEDICINE

## 2023-08-31 PROCEDURE — 84443 ASSAY THYROID STIM HORMONE: CPT

## 2023-08-31 PROCEDURE — 3074F SYST BP LT 130 MM HG: CPT | Performed by: FAMILY MEDICINE

## 2023-08-31 PROCEDURE — 36415 COLL VENOUS BLD VENIPUNCTURE: CPT

## 2023-08-31 PROCEDURE — 80053 COMPREHEN METABOLIC PANEL: CPT

## 2023-08-31 PROCEDURE — 99214 OFFICE O/P EST MOD 30 MIN: CPT | Performed by: FAMILY MEDICINE

## 2023-08-31 PROCEDURE — 84703 CHORIONIC GONADOTROPIN ASSAY: CPT

## 2023-08-31 PROCEDURE — 85027 COMPLETE CBC AUTOMATED: CPT

## 2023-08-31 ASSESSMENT — FIBROSIS 4 INDEX: FIB4 SCORE: 0.62

## 2023-08-31 NOTE — PROGRESS NOTES
"Subjective:   Federica Acuña is a 32 y.o. female here today for   Chief Complaint   Patient presents with    Patient Question     X 2 weeks ago, was in the [place], was really hot and sweating on the train, grab a coke and chips and jittery afterwards, and through out the day, Jittery after not eating frequently, fatigue, weight gain        Patient states that for the last month she has been having episodes of nausea, feeling \"jittery\". She has noted that symptoms occur if she doesn't eat on frequent intervals. Has also noted increasing fatigue.  Patient relates 1 episode while in [place] where she got extremely hot, fatigued and started having significant symptoms.  She states that she had drank a Coke which helped relieve some of the symptoms.  She states that these episodes have occurred on a regular basis over the last several weeks.  Patient also endorses some lightheadedness, occasional dizziness, and fogginess. Denies any syncope or presyncope episodes.   Does have a history of hypertension, currently on lisinopril-hydrochlorothiazide.  Has been checking blood pressures, states they have been relatively normal.  Patient is also on birth control.  States that she is regularly taking it with no complications.    Patient also like to discuss establishing with nutritionist given her weight and high blood pressure.  She would like to work on weight loss and preparation for trying for children with her .    Patient denies any fevers, chills, chest pain, palpitations, headaches, depression, anxiety, diarrhea, constipation.    No Known Allergies      Current medicines (including changes today)  Current Outpatient Medications   Medication Sig Dispense Refill    SPRINTEC 28 0.25-35 MG-MCG per tablet TAKE 1 TABLET BY MOUTH  DAILY 84 Tablet 3    lisinopril-hydrochlorothiazide (PRINZIDE) 10-12.5 MG per tablet TAKE 1 TABLET BY MOUTH  DAILY 90 Tablet 3    omeprazole (PRILOSEC) 40 MG delayed-release " "capsule TAKE 1 CAPSULE BY MOUTH EVERY DAY 90 Capsule 1     No current facility-administered medications for this visit.     She  has a past medical history of Dyslipidemia (4/11/2018) and Pyloric stenosis.    ROS   See HPI       Objective:     Physical Exam:  /78   Pulse 100   Temp 36.4 °C (97.6 °F) (Temporal)   Resp 15   Ht 1.753 m (5' 9.02\")   Wt 90.7 kg (200 lb)   SpO2 98%  Body mass index is 29.52 kg/m².   Constitutional: Alert, no distress.  Skin: Warm, dry, good turgor, no rashes in visible areas.  Eye: Equal, round and reactive, conjunctiva clear, lids normal.  ENMT: TM's clear bilaterally, lips without lesions, good dentition, oropharynx clear.  Neck: Trachea midline, no masses, no thyromegaly. No cervical or supraclavicular lymphadenopathy.  Respiratory: Unlabored respiratory effort, lungs clear to auscultation, no wheezes, no rhonchi.  Cardiovascular: Normal S1, S2, no murmur, no edema.  Abdomen: Soft, non-tender, no masses, no hepatosplenomegaly.  Psych: Alert and oriented x3, normal affect and mood.    Assessment and Plan:     1. Other fatigue  -Uncertain etiology of the constellation of symptoms that patient is experiencing.  Did discuss appropriate hydration, diet.  We will check labs rule any other secondary causes including TSH as well as hCG to for pregnancy.  No red flag symptoms at this time, discussed appropriate follow-up.  We will follow-up with patient with results of labs.  - CBC WITHOUT DIFFERENTIAL; Future  - Comp Metabolic Panel; Future  - TSH WITH REFLEX TO FT4; Future  - HCG QUAL SERUM; Future    2. Dizziness  -See #1.  - CBC WITHOUT DIFFERENTIAL; Future  - Comp Metabolic Panel; Future  - TSH WITH REFLEX TO FT4; Future  - HCG QUAL SERUM; Future    3. Overweight  -Given concomitant symptoms including hypertension, weight loss is recommended.  We will refer to nutrition services to help with diet planning.  Continue with appropriate exercise regimen.  - Referral to Nutrition " Services    4. Essential hypertension  -We will continue lisinopril-hydrochlorothiazide.  Patient would like to work on weight loss for treatment of hypertension.  Will refer to nutritional services as above.  - Referral to Nutrition Services      Followup: No follow-ups on file.         PLEASE NOTE: This dictation was created using voice recognition software. I have made every reasonable attempt to correct obvious errors, but I expect that there are errors of grammar and possibly content that I did not discover before finalizing the note.

## 2023-09-07 ENCOUNTER — NON-PROVIDER VISIT (OUTPATIENT)
Dept: INTERNAL MEDICINE | Facility: OTHER | Age: 33
End: 2023-09-07
Payer: COMMERCIAL

## 2023-09-07 DIAGNOSIS — I10 ESSENTIAL HYPERTENSION: ICD-10-CM

## 2023-09-07 DIAGNOSIS — E66.9 OBESITY (BMI 30-39.9): ICD-10-CM

## 2023-09-07 PROCEDURE — 97802 MEDICAL NUTRITION INDIV IN: CPT | Mod: GT | Performed by: DIETITIAN, REGISTERED

## 2023-09-07 NOTE — PATIENT INSTRUCTIONS
Goals:  Incorporate the plate method - 1/2 your plate veggies/fruits, then 1/4 plate lean protein, and 1/4 plate carbohydrates  Switch to water with meals or try the crystal light or Julio C drops or frozen fruit for flavor  Start to work up to 150 minutes of exercise per week - start out with 10 minutes as many days as you can   If you walk at lunch, try to get a snack or eat lunch beforehand

## 2023-09-07 NOTE — PROGRESS NOTES
Federica is here for nutrition assessment for weight management (overweight), HTN    No A1c in chart  Fasting glucose in Jjanuary 2023 was 104, August 2023 was 92    Reports she saw her doctor last week and hasn't been feeling great  Blood work was ok but she does not feel good  Finds if she goes too long without eating, she will have nausea, jittery and dizzy and finds that it takes her a long time to feel better even after she eats  Also interested in losing weight because she wants to start a family soon    Family history of T1DM with uncles    24 hour recall:* recent changes   B - white rice with butter, garlic with salmon and butter airfried   L - 1/3 salmon, 2 large spoonfulls of rice, couple chocolates that lead to more   D - breaded chicken, red rice     Before making changes, she would eat out a lot - usually fast food or skipping meals - burgers  Started cooking at home instead now     Beverages: Restarted drinking soda - working on reducing it again - stopped buying soda 1 month ago  Drinks diluted juice daily,   Alcohol: no drinking    Exercise: sedentary     PES: Overweight RT excessive calorie intake and limited exercise AEB a current BMI of 29.5    Federica seems very motivtated to make changes to support her goals around weight loss and getting ready to start a family here soon. She has already started to make changes at home that will benefit her and her goals but we discussed today additional changes she is open to working on to support her. One for example is reducing her sugar sweetened beverage intake and starting to get in daily exercise. Worked with her on finding a place to realistically start and we will help support her with progressing at following appoitnments. RTC with RD 1 month    Time spent: 60 minutes    This evaluation was conducted via Zoom using secure and encrypted videoconferencing technology. The patient was in a private location in the state [facility].     The patient's  identity was confirmed and verbal consent was obtained for this virtual visit.

## 2023-09-20 VITALS — HEIGHT: 69 IN | BODY MASS INDEX: 29.62 KG/M2 | WEIGHT: 200 LBS

## 2023-09-20 ASSESSMENT — FIBROSIS 4 INDEX: FIB4 SCORE: 0.48

## 2023-11-01 ENCOUNTER — OFFICE VISIT (OUTPATIENT)
Dept: MEDICAL GROUP | Facility: LAB | Age: 33
End: 2023-11-01
Payer: COMMERCIAL

## 2023-11-01 VITALS
OXYGEN SATURATION: 99 % | RESPIRATION RATE: 16 BRPM | BODY MASS INDEX: 29.03 KG/M2 | HEART RATE: 100 BPM | HEIGHT: 69 IN | SYSTOLIC BLOOD PRESSURE: 134 MMHG | TEMPERATURE: 97.9 F | DIASTOLIC BLOOD PRESSURE: 74 MMHG | WEIGHT: 196 LBS

## 2023-11-01 DIAGNOSIS — I10 ESSENTIAL HYPERTENSION: ICD-10-CM

## 2023-11-01 DIAGNOSIS — Z23 NEED FOR VACCINATION: ICD-10-CM

## 2023-11-01 DIAGNOSIS — Z31.61 PROCREATIVE COUNSELING AND ADVICE USING NATURAL FAMILY PLANNING: ICD-10-CM

## 2023-11-01 PROCEDURE — 90686 IIV4 VACC NO PRSV 0.5 ML IM: CPT | Performed by: FAMILY MEDICINE

## 2023-11-01 PROCEDURE — 90471 IMMUNIZATION ADMIN: CPT | Performed by: FAMILY MEDICINE

## 2023-11-01 PROCEDURE — 3075F SYST BP GE 130 - 139MM HG: CPT | Performed by: FAMILY MEDICINE

## 2023-11-01 PROCEDURE — 99214 OFFICE O/P EST MOD 30 MIN: CPT | Mod: 25 | Performed by: FAMILY MEDICINE

## 2023-11-01 PROCEDURE — 3078F DIAST BP <80 MM HG: CPT | Performed by: FAMILY MEDICINE

## 2023-11-01 RX ORDER — LABETALOL 100 MG/1
100 TABLET, FILM COATED ORAL 2 TIMES DAILY
Qty: 180 TABLET | Refills: 3 | Status: SHIPPED | OUTPATIENT
Start: 2023-11-01 | End: 2023-11-29

## 2023-11-01 ASSESSMENT — FIBROSIS 4 INDEX: FIB4 SCORE: 0.48

## 2023-11-01 NOTE — PROGRESS NOTES
"Subjective:   Federica Acuña is a 32 y.o. female here today for   Chief Complaint   Patient presents with    Hypertension Follow-up     #HTN   -Chronic longstanding history currently treated lisinopril-hydrochlorothiazide.  Patient recently met with nutritionist, work on improving diet and exercise regimen has noticed some improvement in her weight and as well as her quality of life stating that she is feeling much better without as many headaches, dizziness when eating right.  -Patient is planning on trying to become pregnant with her .  She is here to discuss potential medication changes and other advice while attempting pregnancy.  -Patient denies any headaches, lightheadedness, dizziness, chest pain, shortness of breath, abdominal pain.    No Known Allergies      Current medicines (including changes today)  Current Outpatient Medications   Medication Sig Dispense Refill    SPRINTEC 28 0.25-35 MG-MCG per tablet TAKE 1 TABLET BY MOUTH  DAILY 84 Tablet 3    lisinopril-hydrochlorothiazide (PRINZIDE) 10-12.5 MG per tablet TAKE 1 TABLET BY MOUTH  DAILY 90 Tablet 3     No current facility-administered medications for this visit.     She  has a past medical history of Dyslipidemia (4/11/2018) and Pyloric stenosis.    ROS   -See HPI     Objective:     Physical Exam:  /74   Pulse 100   Temp 36.6 °C (97.9 °F) (Temporal)   Resp 16   Ht 1.753 m (5' 9.02\")   Wt 88.9 kg (196 lb)   SpO2 99%  Body mass index is 28.93 kg/m².   Constitutional: Alert, no distress.  Skin: Warm, dry, good turgor, no rashes in visible areas.  Eye: Equal, round and reactive, conjunctiva clear, lids normal.  Respiratory: Unlabored respiratory effort, lungs clear to auscultation, no wheezes, no rhonchi.  Cardiovascular: Normal S1, S2, no murmur, no edema.  Psych: Alert and oriented x3, normal affect and mood.    Assessment and Plan:     1. Essential hypertension  -Patient will be stopping birth control in next few days to " attend pregnancy.  Given this we will go ahead and discontinue lisinopril-hydrochlorothiazide.  Begin labetalol as this is safe during pregnancy.  I did discuss with patient importance of continuation of appropriate diet and exercise regimen, proper hydration.  She will check her blood pressures regularly.  If blood pressure remains above 130/80 with current dose of labetalol then we may need to increase dosage.  Patient will follow-up and let me know if this occurs.  - labetalol (NORMODYNE) 100 MG Tab; Take 1 Tablet by mouth 2 times a day.  Dispense: 180 Tablet; Refill: 3    2. Procreative counseling and advice using natural family planning  -Discussed appropriate medications, discontinue lisinopril-hydrochlorothiazide, discontinue Sprintec.  -Discussed the importance of appropriate high-protein diet which she will continue work with nutritionist.  -Discussed beginning and maintaining prenatal vitamins.  -Further anticipatory guidance given, follow-up precautions given.    3. Need for vaccination  - INFLUENZA VACCINE QUAD INJ (PF)        Followup: No follow-ups on file.         PLEASE NOTE: This dictation was created using voice recognition software. I have made every reasonable attempt to correct obvious errors, but I expect that there are errors of grammar and possibly content that I did not discover before finalizing the note.

## 2023-11-06 ENCOUNTER — PATIENT MESSAGE (OUTPATIENT)
Dept: MEDICAL GROUP | Facility: LAB | Age: 33
End: 2023-11-06
Payer: COMMERCIAL

## 2023-11-06 RX ORDER — LABETALOL 200 MG/1
200 TABLET, FILM COATED ORAL 2 TIMES DAILY
Qty: 180 TABLET | Refills: 3 | Status: SHIPPED | OUTPATIENT
Start: 2023-11-06 | End: 2023-11-29

## 2023-11-07 NOTE — TELEPHONE ENCOUNTER
We will call in increased dosage.  She is to increase labetalol from 100 mg twice daily to 200 mg twice daily.

## 2023-11-09 ENCOUNTER — APPOINTMENT (OUTPATIENT)
Dept: MEDICAL GROUP | Facility: LAB | Age: 33
End: 2023-11-09
Payer: COMMERCIAL

## 2023-11-14 ENCOUNTER — APPOINTMENT (OUTPATIENT)
Dept: INTERNAL MEDICINE | Facility: OTHER | Age: 33
End: 2023-11-14
Payer: COMMERCIAL

## 2023-11-15 RX ORDER — NIFEDIPINE 30 MG
30 TABLET, EXTENDED RELEASE ORAL DAILY
Qty: 90 TABLET | Refills: 1 | Status: SHIPPED | OUTPATIENT
Start: 2023-11-15 | End: 2024-02-01

## 2023-11-26 ENCOUNTER — PATIENT MESSAGE (OUTPATIENT)
Dept: MEDICAL GROUP | Facility: LAB | Age: 33
End: 2023-11-26
Payer: COMMERCIAL

## 2023-11-29 RX ORDER — LABETALOL 300 MG/1
300 TABLET, FILM COATED ORAL 2 TIMES DAILY
Qty: 60 TABLET | Refills: 3 | Status: SHIPPED | OUTPATIENT
Start: 2023-11-29 | End: 2024-01-24 | Stop reason: SDUPTHER

## 2023-12-15 ENCOUNTER — OFFICE VISIT (OUTPATIENT)
Dept: URGENT CARE | Facility: PHYSICIAN GROUP | Age: 33
End: 2023-12-15
Payer: COMMERCIAL

## 2023-12-15 VITALS
SYSTOLIC BLOOD PRESSURE: 118 MMHG | WEIGHT: 190 LBS | HEART RATE: 88 BPM | TEMPERATURE: 97.5 F | BODY MASS INDEX: 28.79 KG/M2 | RESPIRATION RATE: 16 BRPM | OXYGEN SATURATION: 97 % | HEIGHT: 68 IN | DIASTOLIC BLOOD PRESSURE: 72 MMHG

## 2023-12-15 DIAGNOSIS — H69.92 DYSFUNCTION OF LEFT EUSTACHIAN TUBE: ICD-10-CM

## 2023-12-15 PROCEDURE — 3078F DIAST BP <80 MM HG: CPT | Performed by: FAMILY MEDICINE

## 2023-12-15 PROCEDURE — 3074F SYST BP LT 130 MM HG: CPT | Performed by: FAMILY MEDICINE

## 2023-12-15 PROCEDURE — 99213 OFFICE O/P EST LOW 20 MIN: CPT | Performed by: FAMILY MEDICINE

## 2023-12-15 RX ORDER — FEXOFENADINE HCL AND PSEUDOEPHEDRINE HCI 60; 120 MG/1; MG/1
1 TABLET, EXTENDED RELEASE ORAL 2 TIMES DAILY
Qty: 30 TABLET | Refills: 0 | Status: SHIPPED | OUTPATIENT
Start: 2023-12-15 | End: 2024-02-01

## 2023-12-15 ASSESSMENT — FIBROSIS 4 INDEX: FIB4 SCORE: 0.48

## 2023-12-15 NOTE — PROGRESS NOTES
"Subjective:      Chief Complaint   Patient presents with    Otalgia     Lt ear x2d               Otalgia -  left  This is a new problem. The current episode started 3 d ago. The problem occurs constantly. The problem has been unchanged. Associated symptoms include congestion . Pertinent negatives include no abdominal pain, chest pain, chills, fever, headaches, joint swelling, myalgias, nausea, neck pain, rash or visual change. Nothing aggravates the symptoms. She has tried nothing for the symptoms.       Social History     Tobacco Use    Smoking status: Never    Smokeless tobacco: Never   Vaping Use    Vaping Use: Never used   Substance Use Topics    Alcohol use: Yes    Drug use: No         Past Medical History:   Diagnosis Date    Dyslipidemia 4/11/2018    Pyloric stenosis        Current Outpatient Medications on File Prior to Visit   Medication Sig Dispense Refill    labetalol (NORMODYNE) 300 MG Tab Take 1 Tablet by mouth 2 times a day. 60 Tablet 3    NIFEdipine (ADALAT CC) 30 MG CR tablet Take 1 Tablet by mouth every day. (Patient not taking: Reported on 12/15/2023) 90 Tablet 1     No current facility-administered medications on file prior to visit.         Review of Systems   Constitutional: Negative for fever and chills.   HENT: Positive for congestion and ear pain. Negative for hearing loss and tinnitus.    Respiratory: . Negative for hemoptysis, shortness of breath and wheezing.    Cardiovascular: Negative for chest pain, palpitations and leg swelling.   Gastrointestinal: Negative for nausea and abdominal pain.   Musculoskeletal: Negative for myalgias, joint swelling and neck pain.   Skin: Negative for rash.   Neurological: Negative for headaches.   All other systems reviewed and are negative.         Objective:     /72   Pulse 88   Temp 36.4 °C (97.5 °F) (Temporal)   Resp 16   Ht 1.727 m (5' 8\")   Wt 86.2 kg (190 lb)   SpO2 97%     Physical Exam   Constitutional: Vital signs are normal. She is " "active. No distress.   HENT:   Head: There is normal jaw occlusion.   Right Ear: External ear normal. Tympanic membrane is normal. No middle ear effusion.   Left Ear: External ear normal. Tympanic membrane is opaque and \"pink-tinged\". No middle ear effusion.  Nose:    No nasal discharge.   Mouth/Throat: Mucous membranes are moist. No oral lesions.   No oropharyngeal exudate, pharynx swelling or pharynx petechiae.   No   exudate.   Eyes: Conjunctivae and EOM are normal. Pupils are equal, round, and reactive to light. Right eye exhibits no discharge. Left eye exhibits no discharge.   Neck: Normal range of motion. Neck supple.  No LAD  Cardiovascular: Normal rate and regular rhythm.  Pulses are palpable.    No murmur heard.  Pulmonary/Chest: Effort normal and breath sounds normal. There is normal air entry. No respiratory distress. no wheezes, rhonchi,  retraction.   Musculoskeletal:   no edema.   Neurological: A/O x 3.   CN 2-12 intact   Skin: Skin is warm. Capillary refill takes less than 3 seconds. No purpura and no rash noted. Patient is not diaphoretic. No jaundice or pallor.   Nursing note and vitals reviewed.              Assessment/Plan:         1. Dysfunction of left eustachian tube     - fexofenadine-pseudoephedrine (ALLEGRA-D)  MG per tablet; Take 1 Tablet by mouth 2 times a day.  Dispense: 30 Tablet; Refill: 0       Differential diagnosis, natural history, supportive care, and indications for immediate follow-up discussed. All questions answered. Patient agrees with the plan of care.     Follow-up as needed if symptoms worsen or fail to improve to PCP, Urgent care or Emergency Room.     I have personally reviewed prior external notes and test results pertinent to today's visit.  I have independently reviewed and interpreted all diagnostics ordered during this urgent care acute visit.     "

## 2023-12-22 ENCOUNTER — APPOINTMENT (OUTPATIENT)
Dept: MEDICAL GROUP | Facility: LAB | Age: 33
End: 2023-12-22
Payer: COMMERCIAL

## 2024-01-24 RX ORDER — LABETALOL 300 MG/1
300 TABLET, FILM COATED ORAL 2 TIMES DAILY
Qty: 60 TABLET | Refills: 3 | Status: SHIPPED | OUTPATIENT
Start: 2024-01-24 | End: 2024-02-22 | Stop reason: SDUPTHER

## 2024-01-24 NOTE — TELEPHONE ENCOUNTER
Received request via: Pharmacy    Was the patient seen in the last year in this department? Yes    Does the patient have an active prescription (recently filled or refills available) for medication(s) requested? No    [facility]    Does the patient have skilled nursing Plus and need 100 day supply (blood pressure, diabetes and cholesterol meds only)? Patient does not have SCP

## 2024-01-31 SDOH — ECONOMIC STABILITY: FOOD INSECURITY: WITHIN THE PAST 12 MONTHS, THE FOOD YOU BOUGHT JUST DIDN'T LAST AND YOU DIDN'T HAVE MONEY TO GET MORE.: NEVER TRUE

## 2024-01-31 SDOH — ECONOMIC STABILITY: INCOME INSECURITY: IN THE LAST 12 MONTHS, WAS THERE A TIME WHEN YOU WERE NOT ABLE TO PAY THE MORTGAGE OR RENT ON TIME?: NO

## 2024-01-31 SDOH — HEALTH STABILITY: PHYSICAL HEALTH: ON AVERAGE, HOW MANY MINUTES DO YOU ENGAGE IN EXERCISE AT THIS LEVEL?: 30 MIN

## 2024-01-31 SDOH — ECONOMIC STABILITY: TRANSPORTATION INSECURITY
IN THE PAST 12 MONTHS, HAS LACK OF TRANSPORTATION KEPT YOU FROM MEETINGS, WORK, OR FROM GETTING THINGS NEEDED FOR DAILY LIVING?: NO

## 2024-01-31 SDOH — ECONOMIC STABILITY: INCOME INSECURITY: HOW HARD IS IT FOR YOU TO PAY FOR THE VERY BASICS LIKE FOOD, HOUSING, MEDICAL CARE, AND HEATING?: NOT VERY HARD

## 2024-01-31 SDOH — ECONOMIC STABILITY: TRANSPORTATION INSECURITY
IN THE PAST 12 MONTHS, HAS THE LACK OF TRANSPORTATION KEPT YOU FROM MEDICAL APPOINTMENTS OR FROM GETTING MEDICATIONS?: NO

## 2024-01-31 SDOH — ECONOMIC STABILITY: TRANSPORTATION INSECURITY
IN THE PAST 12 MONTHS, HAS LACK OF RELIABLE TRANSPORTATION KEPT YOU FROM MEDICAL APPOINTMENTS, MEETINGS, WORK OR FROM GETTING THINGS NEEDED FOR DAILY LIVING?: NO

## 2024-01-31 SDOH — ECONOMIC STABILITY: FOOD INSECURITY: WITHIN THE PAST 12 MONTHS, YOU WORRIED THAT YOUR FOOD WOULD RUN OUT BEFORE YOU GOT MONEY TO BUY MORE.: NEVER TRUE

## 2024-01-31 SDOH — HEALTH STABILITY: MENTAL HEALTH
STRESS IS WHEN SOMEONE FEELS TENSE, NERVOUS, ANXIOUS, OR CAN'T SLEEP AT NIGHT BECAUSE THEIR MIND IS TROUBLED. HOW STRESSED ARE YOU?: ONLY A LITTLE

## 2024-01-31 SDOH — ECONOMIC STABILITY: HOUSING INSECURITY
IN THE LAST 12 MONTHS, WAS THERE A TIME WHEN YOU DID NOT HAVE A STEADY PLACE TO SLEEP OR SLEPT IN A SHELTER (INCLUDING NOW)?: NO

## 2024-01-31 SDOH — HEALTH STABILITY: PHYSICAL HEALTH: ON AVERAGE, HOW MANY DAYS PER WEEK DO YOU ENGAGE IN MODERATE TO STRENUOUS EXERCISE (LIKE A BRISK WALK)?: 4 DAYS

## 2024-01-31 SDOH — ECONOMIC STABILITY: HOUSING INSECURITY: IN THE LAST 12 MONTHS, HOW MANY PLACES HAVE YOU LIVED?: 1

## 2024-01-31 ASSESSMENT — SOCIAL DETERMINANTS OF HEALTH (SDOH)
WITHIN THE PAST 12 MONTHS, YOU WORRIED THAT YOUR FOOD WOULD RUN OUT BEFORE YOU GOT THE MONEY TO BUY MORE: NEVER TRUE
HOW OFTEN DO YOU ATTENT MEETINGS OF THE CLUB OR ORGANIZATION YOU BELONG TO?: NEVER
DO YOU BELONG TO ANY CLUBS OR ORGANIZATIONS SUCH AS CHURCH GROUPS UNIONS, FRATERNAL OR ATHLETIC GROUPS, OR SCHOOL GROUPS?: NO
HOW OFTEN DO YOU ATTENT MEETINGS OF THE CLUB OR ORGANIZATION YOU BELONG TO?: NEVER
HOW MANY DRINKS CONTAINING ALCOHOL DO YOU HAVE ON A TYPICAL DAY WHEN YOU ARE DRINKING: PATIENT DOES NOT DRINK
HOW HARD IS IT FOR YOU TO PAY FOR THE VERY BASICS LIKE FOOD, HOUSING, MEDICAL CARE, AND HEATING?: NOT VERY HARD
HOW OFTEN DO YOU HAVE A DRINK CONTAINING ALCOHOL: NEVER
IN A TYPICAL WEEK, HOW MANY TIMES DO YOU TALK ON THE PHONE WITH FAMILY, FRIENDS, OR NEIGHBORS?: MORE THAN THREE TIMES A WEEK
IN A TYPICAL WEEK, HOW MANY TIMES DO YOU TALK ON THE PHONE WITH FAMILY, FRIENDS, OR NEIGHBORS?: MORE THAN THREE TIMES A WEEK
HOW OFTEN DO YOU GET TOGETHER WITH FRIENDS OR RELATIVES?: TWICE A WEEK
DO YOU BELONG TO ANY CLUBS OR ORGANIZATIONS SUCH AS CHURCH GROUPS UNIONS, FRATERNAL OR ATHLETIC GROUPS, OR SCHOOL GROUPS?: NO
HOW OFTEN DO YOU ATTEND CHURCH OR RELIGIOUS SERVICES?: NEVER
HOW OFTEN DO YOU HAVE SIX OR MORE DRINKS ON ONE OCCASION: NEVER
HOW OFTEN DO YOU ATTEND CHURCH OR RELIGIOUS SERVICES?: NEVER
HOW OFTEN DO YOU GET TOGETHER WITH FRIENDS OR RELATIVES?: TWICE A WEEK

## 2024-01-31 ASSESSMENT — LIFESTYLE VARIABLES
HOW OFTEN DO YOU HAVE SIX OR MORE DRINKS ON ONE OCCASION: NEVER
SKIP TO QUESTIONS 9-10: 1
AUDIT-C TOTAL SCORE: 0
HOW OFTEN DO YOU HAVE A DRINK CONTAINING ALCOHOL: NEVER
HOW MANY STANDARD DRINKS CONTAINING ALCOHOL DO YOU HAVE ON A TYPICAL DAY: PATIENT DOES NOT DRINK

## 2024-02-01 ENCOUNTER — OFFICE VISIT (OUTPATIENT)
Dept: MEDICAL GROUP | Facility: LAB | Age: 34
End: 2024-02-01
Payer: COMMERCIAL

## 2024-02-01 VITALS
WEIGHT: 200 LBS | RESPIRATION RATE: 16 BRPM | BODY MASS INDEX: 29.62 KG/M2 | OXYGEN SATURATION: 98 % | TEMPERATURE: 97.4 F | SYSTOLIC BLOOD PRESSURE: 126 MMHG | DIASTOLIC BLOOD PRESSURE: 86 MMHG | HEIGHT: 69 IN | HEART RATE: 79 BPM

## 2024-02-01 DIAGNOSIS — R53.83 OTHER FATIGUE: ICD-10-CM

## 2024-02-01 DIAGNOSIS — Z13.6 SCREENING FOR CARDIOVASCULAR CONDITION: ICD-10-CM

## 2024-02-01 DIAGNOSIS — Z11.4 SCREENING FOR HIV (HUMAN IMMUNODEFICIENCY VIRUS): ICD-10-CM

## 2024-02-01 DIAGNOSIS — Z11.59 NEED FOR HEPATITIS C SCREENING TEST: ICD-10-CM

## 2024-02-01 DIAGNOSIS — Z00.00 ANNUAL PHYSICAL EXAM: ICD-10-CM

## 2024-02-01 PROCEDURE — 3079F DIAST BP 80-89 MM HG: CPT | Performed by: FAMILY MEDICINE

## 2024-02-01 PROCEDURE — 99395 PREV VISIT EST AGE 18-39: CPT | Performed by: FAMILY MEDICINE

## 2024-02-01 PROCEDURE — 3074F SYST BP LT 130 MM HG: CPT | Performed by: FAMILY MEDICINE

## 2024-02-01 ASSESSMENT — ENCOUNTER SYMPTOMS
NERVOUS/ANXIOUS: 0
CHILLS: 0
WHEEZING: 0
ABDOMINAL PAIN: 0
PALPITATIONS: 0
DIARRHEA: 0
COUGH: 0
WEIGHT LOSS: 0
NAUSEA: 0
BLURRED VISION: 0
DEPRESSION: 0
HEMOPTYSIS: 0
VOMITING: 0
SHORTNESS OF BREATH: 1
FEVER: 0

## 2024-02-01 ASSESSMENT — PATIENT HEALTH QUESTIONNAIRE - PHQ9: CLINICAL INTERPRETATION OF PHQ2 SCORE: 0

## 2024-02-01 ASSESSMENT — FIBROSIS 4 INDEX: FIB4 SCORE: 0.49

## 2024-02-01 NOTE — PROGRESS NOTES
Subjective:   Federica Acuña is a 33 y.o. female here today for   Chief Complaint   Patient presents with    Annual Exam    Shortness of Breath     When exercising noticed when getting breath back, Feeling stuffy in nose as well.      #Annual   -Reviewed all past medical history, family history, social history.  -Reviewed all screening/vaccinations: Patient due for labs including HIV, hep C screening, lipid profile.  -Diet and Exercise: Appropriate for age and condition.  -Tobacco, alcohol, recreational drug use: No to all  -Sexually active: No new partners  -Established dental home.    #SOB  -recently noted some new SOB with physical activity that has begun  -Patient feels that it is only occurring with exercise but states that it is intermittent, only occurring with activity a couple times a week.   -Endorses some lightheadedness, dizziness with episodes.   -Denies chest pain, palpitations.   -Several months ago patient started on labetalol for blood pressure management as her and her  are trying to get pregnant.        No Known Allergies      Current medicines (including changes today)  Current Outpatient Medications   Medication Sig Dispense Refill    labetalol (NORMODYNE) 300 MG Tab Take 1 Tablet by mouth 2 times a day for 120 days. 60 Tablet 3    fexofenadine-pseudoephedrine (ALLEGRA-D)  MG per tablet Take 1 Tablet by mouth 2 times a day. 30 Tablet 0    NIFEdipine (ADALAT CC) 30 MG CR tablet Take 1 Tablet by mouth every day. 90 Tablet 1     No current facility-administered medications for this visit.     She  has a past medical history of Dyslipidemia (04/11/2018), Hypertension, and Pyloric stenosis.    ROS   Review of Systems   Constitutional:  Positive for malaise/fatigue. Negative for chills, fever and weight loss.   HENT:  Negative for hearing loss.    Eyes:  Negative for blurred vision.   Respiratory:  Positive for shortness of breath. Negative for cough, hemoptysis and wheezing.   "  Cardiovascular:  Negative for chest pain and palpitations.   Gastrointestinal:  Negative for abdominal pain, diarrhea, nausea and vomiting.   Psychiatric/Behavioral:  Negative for depression. The patient is not nervous/anxious.       Objective:     Physical Exam:  /86   Pulse 79   Temp 36.3 °C (97.4 °F) (Temporal)   Resp 16   Ht 1.753 m (5' 9.02\")   Wt 90.7 kg (200 lb)   SpO2 98%  Body mass index is 29.52 kg/m².   Constitutional: Alert, no distress.  Skin: Warm, dry, good turgor, no rashes in visible areas.  Eye: Equal, round and reactive, conjunctiva clear, lids normal.  ENMT: TM's clear bilaterally, lips without lesions, good dentition, oropharynx clear.  Neck: Trachea midline, no masses, no thyromegaly. No cervical or supraclavicular lymphadenopathy.  Respiratory: Unlabored respiratory effort, lungs clear to auscultation, no wheezes, no rhonchi.  Cardiovascular: Normal S1, S2, no murmur, no edema.  Abdomen: Soft, non-tender, no masses, no hepatosplenomegaly.  Psych: Alert and oriented x3, normal affect and mood.    Assessment and Plan:     1. Annual physical exam  -All questions concerns were answered at this time.  -Vaccinations/screenings needed at this time: We will order screening labs as below.  -Labs reviewed, no concerns, check labs as below.  -Discussed the importance of a healthy, Mediterranean style diet, routine exercise regimen.  -Discussed general safety measures including seatbelts, helmets, avoidance of smoking, sunscreen, hydration.  -Follow-up for general physical exam on a yearly basis, sooner if needed.  - CBC WITHOUT DIFFERENTIAL; Future  - Comp Metabolic Panel; Future    2. Other fatigue  -Fatigue, shortness of breath is a new problem, uncertain etiology.  Differential diagnosis includes iatrogenic effects from labetalol versus anemia versus hypothyroid.  Will check labs to rule out any secondary causes.  At this time I do believe given that symptoms started with increasing dose " of labetalol that it is more iatrogenic than anything.  No significant red flag symptoms at this time.  Patient will continue with appropriate diet and exercise regiment, follow-up if symptoms worsen.  - CBC WITHOUT DIFFERENTIAL; Future  - Comp Metabolic Panel; Future  - TSH WITH REFLEX TO FT4; Future    3. Screening for cardiovascular condition  - Lipid Profile; Future    4. Screening for HIV (human immunodeficiency virus)  - HIV AG/AB COMBO ASSAY SCREENING; Future    5. Need for hepatitis C screening test  - HEP C VIRUS ANTIBODY; Future      Followup: No follow-ups on file.         PLEASE NOTE: This dictation was created using voice recognition software. I have made every reasonable attempt to correct obvious errors, but I expect that there are errors of grammar and possibly content that I did not discover before finalizing the note.

## 2024-02-22 RX ORDER — LABETALOL 300 MG/1
300 TABLET, FILM COATED ORAL 2 TIMES DAILY
Qty: 60 TABLET | Refills: 3 | Status: SHIPPED | OUTPATIENT
Start: 2024-02-22 | End: 2024-03-06 | Stop reason: SDUPTHER

## 2024-02-22 NOTE — TELEPHONE ENCOUNTER
Received request via: Pharmacy    Was the patient seen in the last year in this department? Yes    Does the patient have an active prescription (recently filled or refills available) for medication(s) requested? No    Pharmacy Name: [facility]     Does the patient have assisted Plus and need 100 day supply (blood pressure, diabetes and cholesterol meds only)? Patient does not have SCP

## 2024-03-06 RX ORDER — LABETALOL 300 MG/1
300 TABLET, FILM COATED ORAL 2 TIMES DAILY
Qty: 180 TABLET | Refills: 3 | Status: SHIPPED | OUTPATIENT
Start: 2024-03-06

## 2024-03-19 ENCOUNTER — HOSPITAL ENCOUNTER (OUTPATIENT)
Dept: LAB | Facility: MEDICAL CENTER | Age: 34
End: 2024-03-19
Attending: FAMILY MEDICINE
Payer: COMMERCIAL

## 2024-03-19 DIAGNOSIS — R53.83 OTHER FATIGUE: ICD-10-CM

## 2024-03-19 DIAGNOSIS — Z11.59 NEED FOR HEPATITIS C SCREENING TEST: ICD-10-CM

## 2024-03-19 DIAGNOSIS — Z11.4 SCREENING FOR HIV (HUMAN IMMUNODEFICIENCY VIRUS): ICD-10-CM

## 2024-03-19 DIAGNOSIS — Z00.00 ANNUAL PHYSICAL EXAM: ICD-10-CM

## 2024-03-19 DIAGNOSIS — Z13.6 SCREENING FOR CARDIOVASCULAR CONDITION: ICD-10-CM

## 2024-03-19 LAB
ALBUMIN SERPL BCP-MCNC: 4.1 G/DL (ref 3.2–4.9)
ALBUMIN/GLOB SERPL: 1.2 G/DL
ALP SERPL-CCNC: 78 U/L (ref 30–99)
ALT SERPL-CCNC: 43 U/L (ref 2–50)
ANION GAP SERPL CALC-SCNC: 12 MMOL/L (ref 7–16)
AST SERPL-CCNC: 42 U/L (ref 12–45)
BILIRUB SERPL-MCNC: 0.7 MG/DL (ref 0.1–1.5)
BUN SERPL-MCNC: 13 MG/DL (ref 8–22)
CALCIUM ALBUM COR SERPL-MCNC: 9.3 MG/DL (ref 8.5–10.5)
CALCIUM SERPL-MCNC: 9.4 MG/DL (ref 8.5–10.5)
CHLORIDE SERPL-SCNC: 106 MMOL/L (ref 96–112)
CHOLEST SERPL-MCNC: 208 MG/DL (ref 100–199)
CO2 SERPL-SCNC: 17 MMOL/L (ref 20–33)
CREAT SERPL-MCNC: 0.85 MG/DL (ref 0.5–1.4)
ERYTHROCYTE [DISTWIDTH] IN BLOOD BY AUTOMATED COUNT: 37.6 FL (ref 35.9–50)
FASTING STATUS PATIENT QL REPORTED: NORMAL
GFR SERPLBLD CREATININE-BSD FMLA CKD-EPI: 94 ML/MIN/1.73 M 2
GLOBULIN SER CALC-MCNC: 3.3 G/DL (ref 1.9–3.5)
GLUCOSE SERPL-MCNC: 84 MG/DL (ref 65–99)
HCT VFR BLD AUTO: 43.8 % (ref 37–47)
HCV AB SER QL: NORMAL
HDLC SERPL-MCNC: 38 MG/DL
HGB BLD-MCNC: 14.8 G/DL (ref 12–16)
HIV 1+2 AB+HIV1 P24 AG SERPL QL IA: NORMAL
LDLC SERPL CALC-MCNC: 134 MG/DL
MCH RBC QN AUTO: 27.1 PG (ref 27–33)
MCHC RBC AUTO-ENTMCNC: 33.8 G/DL (ref 32.2–35.5)
MCV RBC AUTO: 80.1 FL (ref 81.4–97.8)
PLATELET # BLD AUTO: 317 K/UL (ref 164–446)
PMV BLD AUTO: 11 FL (ref 9–12.9)
POTASSIUM SERPL-SCNC: 4.8 MMOL/L (ref 3.6–5.5)
PROT SERPL-MCNC: 7.4 G/DL (ref 6–8.2)
RBC # BLD AUTO: 5.47 M/UL (ref 4.2–5.4)
SODIUM SERPL-SCNC: 135 MMOL/L (ref 135–145)
TRIGL SERPL-MCNC: 181 MG/DL (ref 0–149)
TSH SERPL DL<=0.005 MIU/L-ACNC: 1.82 UIU/ML (ref 0.38–5.33)
WBC # BLD AUTO: 8.2 K/UL (ref 4.8–10.8)

## 2024-03-19 PROCEDURE — 80061 LIPID PANEL: CPT

## 2024-03-19 PROCEDURE — 36415 COLL VENOUS BLD VENIPUNCTURE: CPT

## 2024-03-19 PROCEDURE — 85027 COMPLETE CBC AUTOMATED: CPT

## 2024-03-19 PROCEDURE — 86803 HEPATITIS C AB TEST: CPT

## 2024-03-19 PROCEDURE — 87389 HIV-1 AG W/HIV-1&-2 AB AG IA: CPT

## 2024-03-19 PROCEDURE — 80053 COMPREHEN METABOLIC PANEL: CPT

## 2024-03-19 PROCEDURE — 84443 ASSAY THYROID STIM HORMONE: CPT

## 2024-04-26 ENCOUNTER — PATIENT MESSAGE (OUTPATIENT)
Dept: MEDICAL GROUP | Facility: LAB | Age: 34
End: 2024-04-26
Payer: COMMERCIAL

## 2024-04-26 ENCOUNTER — HOSPITAL ENCOUNTER (OUTPATIENT)
Dept: LAB | Facility: MEDICAL CENTER | Age: 34
End: 2024-04-26
Attending: FAMILY MEDICINE
Payer: COMMERCIAL

## 2024-04-26 DIAGNOSIS — Z34.90 PREGNANCY, UNSPECIFIED GESTATIONAL AGE: ICD-10-CM

## 2024-04-26 LAB — HCG SERPL QL: POSITIVE

## 2024-04-26 PROCEDURE — 84703 CHORIONIC GONADOTROPIN ASSAY: CPT

## 2024-04-26 PROCEDURE — 36415 COLL VENOUS BLD VENIPUNCTURE: CPT

## 2024-04-29 ENCOUNTER — PATIENT MESSAGE (OUTPATIENT)
Dept: MEDICAL GROUP | Facility: LAB | Age: 34
End: 2024-04-29
Payer: COMMERCIAL

## 2024-04-29 DIAGNOSIS — Z34.90 PREGNANCY, UNSPECIFIED GESTATIONAL AGE: ICD-10-CM

## 2024-11-28 ENCOUNTER — HOSPITAL ENCOUNTER (OUTPATIENT)
Facility: MEDICAL CENTER | Age: 34
End: 2024-11-28
Attending: OBSTETRICS & GYNECOLOGY | Admitting: OBSTETRICS & GYNECOLOGY
Payer: COMMERCIAL

## 2024-11-28 VITALS
HEART RATE: 89 BPM | DIASTOLIC BLOOD PRESSURE: 77 MMHG | WEIGHT: 209 LBS | TEMPERATURE: 97.7 F | HEIGHT: 69 IN | RESPIRATION RATE: 16 BRPM | OXYGEN SATURATION: 97 % | BODY MASS INDEX: 30.96 KG/M2 | SYSTOLIC BLOOD PRESSURE: 116 MMHG

## 2024-11-28 PROCEDURE — 59025 FETAL NON-STRESS TEST: CPT

## 2024-11-28 ASSESSMENT — FIBROSIS 4 INDEX: FIB4 SCORE: 0.63

## 2024-11-28 NOTE — DISCHARGE INSTRUCTIONS
General Instructions:  If you think you are in labor, time contractions (lying on your left side) from the beginning of one contraction to the beginning of the next contraction for at least one hour.  Increase fluid intake: you should consume 10-12 8 oz glasses of non-caffeinated fluid per day.  Report any pressure or burning on urination to your physician.  Monitor fetal movement: If you notice an absence or decrease in fetal movement, drink a large glass of water and rest on your side.  If there is no increase in movement, go to the hospital for further evaluation.  Report any sudden, sharp abdominal pain.  Report any bleeding.  Spotting or pinkish discharge is normal after vaginal exam.  You may also spot after sexual intercourse.    Other Instructions:  Please carefully review your entire AFTER VISIT SUMMARY document for all discharge instructions.    Pre-term Labor (<37 weeks):  Call your physician or return to the hospital if:  You have painless regular contractions more than 4 in one hour.  Your water breaks (remember time and color).  You have menstrual-like cramps, a low dull backache or pressure in your pelvis or back.  Your baby does not move enough to complete the daily kick count (10 movements in 2 hours).  Your baby moves much less often than on the days before or you have not felt your baby move all day.  Please review the MEDICATION LIST section of your AFTER VISIT SUMMARY document.  Take your medication as prescribed

## 2024-11-28 NOTE — ED PROVIDER NOTES
"Obstetrics and Gynecology  Obstetric Emergency Department Assessment Note    ID: 33 y.o. is a  with IUP at 35+3 weeks    Primary Obstetrician: Courtney Thompson M.D.    Assessing Obstetrician: Latesha Rocha MD    S: Pt presents to L&D for scheduled NST. She is feeling well today w/out complaints. No VB, CTX, or LOF. Baby moving well. She reports her baby is breech. She also has CHTN and takes Labetalol 300mg BID. Home BP per pt is 110s-120s/70s-80s. No HA, RUQ pain, or visual changes. She has ankle swelling but she states it is mild and has been present for a little while.     ROS: 10 systems negative except as noted above.    O: /77   Pulse 89   Resp 16   Ht 1.753 m (5' 9\")   Wt 94.8 kg (209 lb)   SpO2 97%   BMI 30.86 kg/m²    Gen: NAD, AAO  HEENT: NC/AT, MMM  Neck: supple  Abd: Gravid, soft, uterus NTTP, no rebound/guarding  Ext: WWP, 2+ DPP, 1+ ankle edema - non-pitting  Skin: no rash  Neuro: no gross deficits, EOMI  Psy: mood good, affect normal and congruent  Pelvic: SVE deferred    NST Report Review:  NST: 135, pos accels, neg decels, moderate variability, reactive, category 1  [place]: one ctx on toco    Assessment: Federica Acuña is a 33 y.o.  at 35+3 weeks  Fetal IUGR  CHTN - on Labetalol 300mg BID, BP normal  Breech fetal presentation  Reassuring, reactive NST.    Plan:  Pt discharged to home  Advised to do Spinning Babies exercises to try to rotate baby to vertex  PIH precautions educated  FKC/labor precautions educated  F/u with Dr. Thompson next week as scheduled  Return prn concerns      Latesha Rocha M.D., 2024, 8:53 AM     "

## 2024-11-28 NOTE — PROGRESS NOTES
0810 - Patient of Dr. Thompson presents for scheduled NST.   [place] Gestation today at 35.3 weeks    Biweekly NST for growth restriction - per patient.     Denies contractions/cramping, denies ROM or bleeding. Denies change to vision/edema/HA, Reports frequent FM. FM/TOCO use discussed and placed, POC discussed.   JESICA Fernandez at the BS.     Dry erase board updated with RN contact information; reviewed.   Patient encouraged to call RN with all questions concerns needs prn. Patient has not eaten breakfast yet today.   Apple juice at the BS    0843 - Report to Dr. Rocha regarding patient arrival/complaint/status - physician to see patient at the BS.   0845 - Dr. Rocha at the BS assessing status, NST and POC for discharge home.     0855 - Patient discharged home with specific instruction to return to L&D/Physician ie.. Bleeding/ROM/decreased FM/labor/concerns for self or baby.

## 2024-12-10 ENCOUNTER — APPOINTMENT (OUTPATIENT)
Dept: ADMISSIONS | Facility: MEDICAL CENTER | Age: 34
End: 2024-12-10
Attending: OBSTETRICS & GYNECOLOGY
Payer: COMMERCIAL

## 2024-12-12 ENCOUNTER — ANESTHESIA EVENT (OUTPATIENT)
Dept: OBGYN | Facility: MEDICAL CENTER | Age: 34
End: 2024-12-12
Payer: COMMERCIAL

## 2024-12-12 ENCOUNTER — ANESTHESIA (OUTPATIENT)
Dept: OBGYN | Facility: MEDICAL CENTER | Age: 34
End: 2024-12-12
Payer: COMMERCIAL

## 2024-12-12 ENCOUNTER — HOSPITAL ENCOUNTER (INPATIENT)
Facility: MEDICAL CENTER | Age: 34
LOS: 5 days | End: 2024-12-17
Attending: OBSTETRICS & GYNECOLOGY | Admitting: OBSTETRICS & GYNECOLOGY
Payer: COMMERCIAL

## 2024-12-12 DIAGNOSIS — Z98.891 STATUS POST CESAREAN DELIVERY: ICD-10-CM

## 2024-12-12 DIAGNOSIS — I10 ESSENTIAL HYPERTENSION: ICD-10-CM

## 2024-12-12 LAB
ABO + RH BLD: NORMAL
ABO GROUP BLD: NORMAL
BASOPHILS # BLD AUTO: 0.5 % (ref 0–1.8)
BASOPHILS # BLD: 0.06 K/UL (ref 0–0.12)
BLD GP AB SCN SERPL QL: NORMAL
EOSINOPHIL # BLD AUTO: 0.14 K/UL (ref 0–0.51)
EOSINOPHIL NFR BLD: 1.3 % (ref 0–6.9)
ERYTHROCYTE [DISTWIDTH] IN BLOOD BY AUTOMATED COUNT: 40 FL (ref 35.9–50)
ERYTHROCYTE [DISTWIDTH] IN BLOOD BY AUTOMATED COUNT: 40.2 FL (ref 35.9–50)
HCT VFR BLD AUTO: 31.8 % (ref 37–47)
HCT VFR BLD AUTO: 37.5 % (ref 37–47)
HGB BLD-MCNC: 10.8 G/DL (ref 12–16)
HGB BLD-MCNC: 12.2 G/DL (ref 12–16)
HOLDING TUBE BB 8507: NORMAL
IMM GRANULOCYTES # BLD AUTO: 0.04 K/UL (ref 0–0.11)
IMM GRANULOCYTES NFR BLD AUTO: 0.4 % (ref 0–0.9)
LYMPHOCYTES # BLD AUTO: 2.49 K/UL (ref 1–4.8)
LYMPHOCYTES NFR BLD: 22.7 % (ref 22–41)
MCH RBC QN AUTO: 25.3 PG (ref 27–33)
MCH RBC QN AUTO: 26.3 PG (ref 27–33)
MCHC RBC AUTO-ENTMCNC: 32.5 G/DL (ref 32.2–35.5)
MCHC RBC AUTO-ENTMCNC: 34 G/DL (ref 32.2–35.5)
MCV RBC AUTO: 77.4 FL (ref 81.4–97.8)
MCV RBC AUTO: 77.6 FL (ref 81.4–97.8)
MONOCYTES # BLD AUTO: 0.77 K/UL (ref 0–0.85)
MONOCYTES NFR BLD AUTO: 7 % (ref 0–13.4)
NEUTROPHILS # BLD AUTO: 7.49 K/UL (ref 1.82–7.42)
NEUTROPHILS NFR BLD: 68.1 % (ref 44–72)
NRBC # BLD AUTO: 0 K/UL
NRBC BLD-RTO: 0 /100 WBC (ref 0–0.2)
PLATELET # BLD AUTO: 283 K/UL (ref 164–446)
PLATELET # BLD AUTO: 285 K/UL (ref 164–446)
PMV BLD AUTO: 10.8 FL (ref 9–12.9)
PMV BLD AUTO: 11.3 FL (ref 9–12.9)
RBC # BLD AUTO: 4.11 M/UL (ref 4.2–5.4)
RBC # BLD AUTO: 4.83 M/UL (ref 4.2–5.4)
RH BLD: NORMAL
T PALLIDUM AB SER QL IA: NONREACTIVE
WBC # BLD AUTO: 11 K/UL (ref 4.8–10.8)
WBC # BLD AUTO: 13.4 K/UL (ref 4.8–10.8)

## 2024-12-12 PROCEDURE — A9270 NON-COVERED ITEM OR SERVICE: HCPCS | Performed by: OBSTETRICS & GYNECOLOGY

## 2024-12-12 PROCEDURE — 86850 RBC ANTIBODY SCREEN: CPT

## 2024-12-12 PROCEDURE — 160009 HCHG ANES TIME/MIN: Performed by: OBSTETRICS & GYNECOLOGY

## 2024-12-12 PROCEDURE — 160029 HCHG SURGERY MINUTES - 1ST 30 MINS LEVEL 4: Performed by: OBSTETRICS & GYNECOLOGY

## 2024-12-12 PROCEDURE — A9270 NON-COVERED ITEM OR SERVICE: HCPCS | Performed by: ANESTHESIOLOGY

## 2024-12-12 PROCEDURE — 160035 HCHG PACU - 1ST 60 MINS PHASE I: Performed by: OBSTETRICS & GYNECOLOGY

## 2024-12-12 PROCEDURE — 86901 BLOOD TYPING SEROLOGIC RH(D): CPT

## 2024-12-12 PROCEDURE — 700105 HCHG RX REV CODE 258: Performed by: ANESTHESIOLOGY

## 2024-12-12 PROCEDURE — 700102 HCHG RX REV CODE 250 W/ 637 OVERRIDE(OP): Performed by: ANESTHESIOLOGY

## 2024-12-12 PROCEDURE — 36415 COLL VENOUS BLD VENIPUNCTURE: CPT

## 2024-12-12 PROCEDURE — 86780 TREPONEMA PALLIDUM: CPT

## 2024-12-12 PROCEDURE — 700105 HCHG RX REV CODE 258: Performed by: OBSTETRICS & GYNECOLOGY

## 2024-12-12 PROCEDURE — 160041 HCHG SURGERY MINUTES - EA ADDL 1 MIN LEVEL 4: Performed by: OBSTETRICS & GYNECOLOGY

## 2024-12-12 PROCEDURE — 86900 BLOOD TYPING SEROLOGIC ABO: CPT | Mod: 91

## 2024-12-12 PROCEDURE — 770002 HCHG ROOM/CARE - OB PRIVATE (112)

## 2024-12-12 PROCEDURE — 160048 HCHG OR STATISTICAL LEVEL 1-5: Performed by: OBSTETRICS & GYNECOLOGY

## 2024-12-12 PROCEDURE — 160002 HCHG RECOVERY MINUTES (STAT): Performed by: OBSTETRICS & GYNECOLOGY

## 2024-12-12 PROCEDURE — 85027 COMPLETE CBC AUTOMATED: CPT

## 2024-12-12 PROCEDURE — 700111 HCHG RX REV CODE 636 W/ 250 OVERRIDE (IP): Mod: JZ | Performed by: OBSTETRICS & GYNECOLOGY

## 2024-12-12 PROCEDURE — 700111 HCHG RX REV CODE 636 W/ 250 OVERRIDE (IP): Performed by: ANESTHESIOLOGY

## 2024-12-12 PROCEDURE — C1755 CATHETER, INTRASPINAL: HCPCS | Performed by: OBSTETRICS & GYNECOLOGY

## 2024-12-12 PROCEDURE — 85025 COMPLETE CBC W/AUTO DIFF WBC: CPT

## 2024-12-12 PROCEDURE — 700102 HCHG RX REV CODE 250 W/ 637 OVERRIDE(OP): Performed by: OBSTETRICS & GYNECOLOGY

## 2024-12-12 PROCEDURE — 700111 HCHG RX REV CODE 636 W/ 250 OVERRIDE (IP): Performed by: OBSTETRICS & GYNECOLOGY

## 2024-12-12 PROCEDURE — 700111 HCHG RX REV CODE 636 W/ 250 OVERRIDE (IP): Mod: JZ | Performed by: ANESTHESIOLOGY

## 2024-12-12 RX ORDER — MEPERIDINE HYDROCHLORIDE 25 MG/ML
12.5 INJECTION INTRAMUSCULAR; INTRAVENOUS; SUBCUTANEOUS
Status: CANCELLED | OUTPATIENT
Start: 2024-12-12

## 2024-12-12 RX ORDER — MORPHINE SULFATE 0.5 MG/ML
INJECTION, SOLUTION EPIDURAL; INTRATHECAL; INTRAVENOUS
Status: COMPLETED | OUTPATIENT
Start: 2024-12-12 | End: 2024-12-12

## 2024-12-12 RX ORDER — DIPHENHYDRAMINE HYDROCHLORIDE 50 MG/ML
12.5 INJECTION INTRAMUSCULAR; INTRAVENOUS EVERY 6 HOURS PRN
Status: ACTIVE | OUTPATIENT
Start: 2024-12-12 | End: 2024-12-13

## 2024-12-12 RX ORDER — IBUPROFEN 800 MG/1
800 TABLET, FILM COATED ORAL EVERY 8 HOURS
Status: COMPLETED | OUTPATIENT
Start: 2024-12-13 | End: 2024-12-16

## 2024-12-12 RX ORDER — CITRIC ACID/SODIUM CITRATE 334-500MG
30 SOLUTION, ORAL ORAL ONCE
Status: COMPLETED | OUTPATIENT
Start: 2024-12-12 | End: 2024-12-12

## 2024-12-12 RX ORDER — SCOLOPAMINE TRANSDERMAL SYSTEM 1 MG/1
1 PATCH, EXTENDED RELEASE TRANSDERMAL
Status: DISCONTINUED | OUTPATIENT
Start: 2024-12-12 | End: 2024-12-17 | Stop reason: HOSPADM

## 2024-12-12 RX ORDER — ONDANSETRON 2 MG/ML
INJECTION INTRAMUSCULAR; INTRAVENOUS PRN
Status: DISCONTINUED | OUTPATIENT
Start: 2024-12-12 | End: 2024-12-12 | Stop reason: SURG

## 2024-12-12 RX ORDER — IPRATROPIUM BROMIDE AND ALBUTEROL SULFATE 2.5; .5 MG/3ML; MG/3ML
3 SOLUTION RESPIRATORY (INHALATION)
Status: CANCELLED | OUTPATIENT
Start: 2024-12-12

## 2024-12-12 RX ORDER — HYDROMORPHONE HYDROCHLORIDE 1 MG/ML
0.2 INJECTION, SOLUTION INTRAMUSCULAR; INTRAVENOUS; SUBCUTANEOUS
Status: CANCELLED | OUTPATIENT
Start: 2024-12-12

## 2024-12-12 RX ORDER — ACETAMINOPHEN 500 MG
1000 TABLET ORAL EVERY 6 HOURS
Status: DISPENSED | OUTPATIENT
Start: 2024-12-12 | End: 2024-12-13

## 2024-12-12 RX ORDER — OXYCODONE HYDROCHLORIDE 5 MG/1
5 TABLET ORAL EVERY 4 HOURS PRN
Status: DISCONTINUED | OUTPATIENT
Start: 2024-12-13 | End: 2024-12-17 | Stop reason: HOSPADM

## 2024-12-12 RX ORDER — OXYCODONE HYDROCHLORIDE 5 MG/1
10 TABLET ORAL EVERY 4 HOURS PRN
Status: ACTIVE | OUTPATIENT
Start: 2024-12-12 | End: 2024-12-13

## 2024-12-12 RX ORDER — LABETALOL HYDROCHLORIDE 5 MG/ML
5 INJECTION, SOLUTION INTRAVENOUS
Status: CANCELLED | OUTPATIENT
Start: 2024-12-12

## 2024-12-12 RX ORDER — DEXAMETHASONE SODIUM PHOSPHATE 4 MG/ML
INJECTION, SOLUTION INTRA-ARTICULAR; INTRALESIONAL; INTRAMUSCULAR; INTRAVENOUS; SOFT TISSUE PRN
Status: DISCONTINUED | OUTPATIENT
Start: 2024-12-12 | End: 2024-12-12 | Stop reason: SURG

## 2024-12-12 RX ORDER — SIMETHICONE 125 MG
125 TABLET,CHEWABLE ORAL 4 TIMES DAILY PRN
Status: DISCONTINUED | OUTPATIENT
Start: 2024-12-12 | End: 2024-12-17 | Stop reason: HOSPADM

## 2024-12-12 RX ORDER — DIPHENHYDRAMINE HYDROCHLORIDE 50 MG/ML
25 INJECTION INTRAMUSCULAR; INTRAVENOUS EVERY 6 HOURS PRN
Status: DISCONTINUED | OUTPATIENT
Start: 2024-12-13 | End: 2024-12-17 | Stop reason: HOSPADM

## 2024-12-12 RX ORDER — EPHEDRINE SULFATE 50 MG/ML
10 INJECTION, SOLUTION INTRAVENOUS
Status: ACTIVE | OUTPATIENT
Start: 2024-12-12 | End: 2024-12-13

## 2024-12-12 RX ORDER — ACETAMINOPHEN 500 MG
1000 TABLET ORAL EVERY 6 HOURS PRN
Status: DISCONTINUED | OUTPATIENT
Start: 2024-12-16 | End: 2024-12-17 | Stop reason: HOSPADM

## 2024-12-12 RX ORDER — OXYTOCIN 10 [USP'U]/ML
INJECTION, SOLUTION INTRAMUSCULAR; INTRAVENOUS PRN
Status: DISCONTINUED | OUTPATIENT
Start: 2024-12-12 | End: 2024-12-12 | Stop reason: SURG

## 2024-12-12 RX ORDER — CALCIUM CARBONATE 500 MG/1
1000 TABLET, CHEWABLE ORAL EVERY 6 HOURS PRN
Status: DISCONTINUED | OUTPATIENT
Start: 2024-12-12 | End: 2024-12-17 | Stop reason: HOSPADM

## 2024-12-12 RX ORDER — DIPHENHYDRAMINE HYDROCHLORIDE 50 MG/ML
12.5 INJECTION INTRAMUSCULAR; INTRAVENOUS EVERY 6 HOURS PRN
Status: DISPENSED | OUTPATIENT
Start: 2024-12-12 | End: 2024-12-13

## 2024-12-12 RX ORDER — HYDROMORPHONE HYDROCHLORIDE 1 MG/ML
0.1 INJECTION, SOLUTION INTRAMUSCULAR; INTRAVENOUS; SUBCUTANEOUS
Status: CANCELLED | OUTPATIENT
Start: 2024-12-12

## 2024-12-12 RX ORDER — ACETAMINOPHEN 500 MG
1000 TABLET ORAL EVERY 6 HOURS
Status: COMPLETED | OUTPATIENT
Start: 2024-12-13 | End: 2024-12-16

## 2024-12-12 RX ORDER — MISOPROSTOL 200 UG/1
800 TABLET ORAL
Status: DISCONTINUED | OUTPATIENT
Start: 2024-12-12 | End: 2024-12-17 | Stop reason: HOSPADM

## 2024-12-12 RX ORDER — ONDANSETRON 2 MG/ML
4 INJECTION INTRAMUSCULAR; INTRAVENOUS EVERY 6 HOURS PRN
Status: DISPENSED | OUTPATIENT
Start: 2024-12-12 | End: 2024-12-13

## 2024-12-12 RX ORDER — KETOROLAC TROMETHAMINE 15 MG/ML
INJECTION, SOLUTION INTRAMUSCULAR; INTRAVENOUS PRN
Status: DISCONTINUED | OUTPATIENT
Start: 2024-12-12 | End: 2024-12-12 | Stop reason: SURG

## 2024-12-12 RX ORDER — OXYCODONE HCL 5 MG/5 ML
5 SOLUTION, ORAL ORAL
Status: CANCELLED | OUTPATIENT
Start: 2024-12-12

## 2024-12-12 RX ORDER — IBUPROFEN 800 MG/1
800 TABLET, FILM COATED ORAL EVERY 8 HOURS PRN
Status: DISCONTINUED | OUTPATIENT
Start: 2024-12-16 | End: 2024-12-17 | Stop reason: HOSPADM

## 2024-12-12 RX ORDER — SODIUM CHLORIDE 9 MG/ML
INJECTION, SOLUTION INTRAVENOUS ONCE
Status: ACTIVE | OUTPATIENT
Start: 2024-12-12 | End: 2024-12-15

## 2024-12-12 RX ORDER — DIPHENHYDRAMINE HYDROCHLORIDE 50 MG/ML
25 INJECTION INTRAMUSCULAR; INTRAVENOUS EVERY 6 HOURS PRN
Status: ACTIVE | OUTPATIENT
Start: 2024-12-12 | End: 2024-12-13

## 2024-12-12 RX ORDER — CEFAZOLIN SODIUM 1 G/3ML
2 INJECTION, POWDER, FOR SOLUTION INTRAMUSCULAR; INTRAVENOUS ONCE
Status: COMPLETED | OUTPATIENT
Start: 2024-12-12 | End: 2024-12-12

## 2024-12-12 RX ORDER — SODIUM CHLORIDE, SODIUM LACTATE, POTASSIUM CHLORIDE, CALCIUM CHLORIDE 600; 310; 30; 20 MG/100ML; MG/100ML; MG/100ML; MG/100ML
INJECTION, SOLUTION INTRAVENOUS CONTINUOUS
Status: DISCONTINUED | OUTPATIENT
Start: 2024-12-12 | End: 2024-12-15

## 2024-12-12 RX ORDER — OXYCODONE HCL 5 MG/5 ML
10 SOLUTION, ORAL ORAL
Status: CANCELLED | OUTPATIENT
Start: 2024-12-12

## 2024-12-12 RX ORDER — SODIUM CHLORIDE, SODIUM LACTATE, POTASSIUM CHLORIDE, CALCIUM CHLORIDE 600; 310; 30; 20 MG/100ML; MG/100ML; MG/100ML; MG/100ML
INJECTION, SOLUTION INTRAVENOUS CONTINUOUS
Status: CANCELLED | OUTPATIENT
Start: 2024-12-12

## 2024-12-12 RX ORDER — CARBOPROST TROMETHAMINE 250 UG/ML
250 INJECTION, SOLUTION INTRAMUSCULAR
Status: DISCONTINUED | OUTPATIENT
Start: 2024-12-12 | End: 2024-12-17 | Stop reason: HOSPADM

## 2024-12-12 RX ORDER — DIPHENHYDRAMINE HCL 25 MG
25 TABLET ORAL EVERY 6 HOURS PRN
Status: DISCONTINUED | OUTPATIENT
Start: 2024-12-13 | End: 2024-12-17 | Stop reason: HOSPADM

## 2024-12-12 RX ORDER — DOCUSATE SODIUM 100 MG/1
100 CAPSULE, LIQUID FILLED ORAL 2 TIMES DAILY PRN
Status: DISCONTINUED | OUTPATIENT
Start: 2024-12-12 | End: 2024-12-17 | Stop reason: HOSPADM

## 2024-12-12 RX ORDER — SODIUM CHLORIDE, SODIUM LACTATE, POTASSIUM CHLORIDE, AND CALCIUM CHLORIDE .6; .31; .03; .02 G/100ML; G/100ML; G/100ML; G/100ML
1000 INJECTION, SOLUTION INTRAVENOUS ONCE
Status: DISCONTINUED | OUTPATIENT
Start: 2024-12-12 | End: 2024-12-12 | Stop reason: RX

## 2024-12-12 RX ORDER — HALOPERIDOL 5 MG/ML
1 INJECTION INTRAMUSCULAR
Status: CANCELLED | OUTPATIENT
Start: 2024-12-12

## 2024-12-12 RX ORDER — SODIUM CHLORIDE, SODIUM GLUCONATE, SODIUM ACETATE, POTASSIUM CHLORIDE AND MAGNESIUM CHLORIDE 526; 502; 368; 37; 30 MG/100ML; MG/100ML; MG/100ML; MG/100ML; MG/100ML
1000 INJECTION, SOLUTION INTRAVENOUS ONCE
Status: COMPLETED | OUTPATIENT
Start: 2024-12-12 | End: 2024-12-12

## 2024-12-12 RX ORDER — OXYTOCIN 10 [USP'U]/ML
10 INJECTION, SOLUTION INTRAMUSCULAR; INTRAVENOUS
Status: DISCONTINUED | OUTPATIENT
Start: 2024-12-12 | End: 2024-12-12

## 2024-12-12 RX ORDER — OXYCODONE HYDROCHLORIDE 5 MG/1
10 TABLET ORAL EVERY 4 HOURS PRN
Status: DISCONTINUED | OUTPATIENT
Start: 2024-12-13 | End: 2024-12-17 | Stop reason: HOSPADM

## 2024-12-12 RX ORDER — EPHEDRINE SULFATE 50 MG/ML
5 INJECTION, SOLUTION INTRAVENOUS
Status: CANCELLED | OUTPATIENT
Start: 2024-12-12

## 2024-12-12 RX ORDER — SODIUM CHLORIDE, SODIUM GLUCONATE, SODIUM ACETATE, POTASSIUM CHLORIDE AND MAGNESIUM CHLORIDE 526; 502; 368; 37; 30 MG/100ML; MG/100ML; MG/100ML; MG/100ML; MG/100ML
INJECTION, SOLUTION INTRAVENOUS
Status: DISCONTINUED | OUTPATIENT
Start: 2024-12-12 | End: 2024-12-12 | Stop reason: SURG

## 2024-12-12 RX ORDER — HYDROMORPHONE HYDROCHLORIDE 1 MG/ML
0.2 INJECTION, SOLUTION INTRAMUSCULAR; INTRAVENOUS; SUBCUTANEOUS
Status: ACTIVE | OUTPATIENT
Start: 2024-12-12 | End: 2024-12-13

## 2024-12-12 RX ORDER — METOCLOPRAMIDE HYDROCHLORIDE 5 MG/ML
10 INJECTION INTRAMUSCULAR; INTRAVENOUS ONCE
Status: COMPLETED | OUTPATIENT
Start: 2024-12-12 | End: 2024-12-12

## 2024-12-12 RX ORDER — ONDANSETRON 2 MG/ML
4 INJECTION INTRAMUSCULAR; INTRAVENOUS
Status: CANCELLED | OUTPATIENT
Start: 2024-12-12

## 2024-12-12 RX ORDER — OXYCODONE HYDROCHLORIDE 5 MG/1
5 TABLET ORAL EVERY 4 HOURS PRN
Status: ACTIVE | OUTPATIENT
Start: 2024-12-12 | End: 2024-12-13

## 2024-12-12 RX ORDER — DIPHENHYDRAMINE HYDROCHLORIDE 50 MG/ML
12.5 INJECTION INTRAMUSCULAR; INTRAVENOUS
Status: CANCELLED | OUTPATIENT
Start: 2024-12-12

## 2024-12-12 RX ORDER — BUPIVACAINE HYDROCHLORIDE 7.5 MG/ML
INJECTION, SOLUTION INTRASPINAL
Status: COMPLETED | OUTPATIENT
Start: 2024-12-12 | End: 2024-12-12

## 2024-12-12 RX ORDER — HYDRALAZINE HYDROCHLORIDE 20 MG/ML
5 INJECTION INTRAMUSCULAR; INTRAVENOUS
Status: CANCELLED | OUTPATIENT
Start: 2024-12-12

## 2024-12-12 RX ORDER — LABETALOL 300 MG/1
300 TABLET, FILM COATED ORAL 2 TIMES DAILY
Status: DISCONTINUED | OUTPATIENT
Start: 2024-12-12 | End: 2024-12-17 | Stop reason: HOSPADM

## 2024-12-12 RX ORDER — SODIUM CHLORIDE, SODIUM LACTATE, POTASSIUM CHLORIDE, CALCIUM CHLORIDE 600; 310; 30; 20 MG/100ML; MG/100ML; MG/100ML; MG/100ML
2000 INJECTION, SOLUTION INTRAVENOUS PRN
Status: DISCONTINUED | OUTPATIENT
Start: 2024-12-12 | End: 2024-12-17 | Stop reason: HOSPADM

## 2024-12-12 RX ORDER — MIDAZOLAM HYDROCHLORIDE 1 MG/ML
1 INJECTION INTRAMUSCULAR; INTRAVENOUS
Status: CANCELLED | OUTPATIENT
Start: 2024-12-12

## 2024-12-12 RX ORDER — HYDROMORPHONE HYDROCHLORIDE 1 MG/ML
0.4 INJECTION, SOLUTION INTRAMUSCULAR; INTRAVENOUS; SUBCUTANEOUS
Status: CANCELLED | OUTPATIENT
Start: 2024-12-12

## 2024-12-12 RX ORDER — LABETALOL HYDROCHLORIDE 5 MG/ML
10 INJECTION, SOLUTION INTRAVENOUS
Status: DISCONTINUED | OUTPATIENT
Start: 2024-12-12 | End: 2024-12-17 | Stop reason: HOSPADM

## 2024-12-12 RX ORDER — ONDANSETRON 2 MG/ML
4 INJECTION INTRAMUSCULAR; INTRAVENOUS EVERY 6 HOURS PRN
Status: DISCONTINUED | OUTPATIENT
Start: 2024-12-13 | End: 2024-12-17 | Stop reason: HOSPADM

## 2024-12-12 RX ORDER — KETOROLAC TROMETHAMINE 15 MG/ML
15 INJECTION, SOLUTION INTRAMUSCULAR; INTRAVENOUS EVERY 6 HOURS
Status: DISPENSED | OUTPATIENT
Start: 2024-12-12 | End: 2024-12-13

## 2024-12-12 RX ORDER — ONDANSETRON 4 MG/1
4 TABLET, ORALLY DISINTEGRATING ORAL EVERY 6 HOURS PRN
Status: DISCONTINUED | OUTPATIENT
Start: 2024-12-13 | End: 2024-12-17 | Stop reason: HOSPADM

## 2024-12-12 RX ORDER — HYDROMORPHONE HYDROCHLORIDE 1 MG/ML
0.4 INJECTION, SOLUTION INTRAMUSCULAR; INTRAVENOUS; SUBCUTANEOUS
Status: ACTIVE | OUTPATIENT
Start: 2024-12-12 | End: 2024-12-13

## 2024-12-12 RX ADMIN — KETOROLAC TROMETHAMINE 15 MG: 15 INJECTION, SOLUTION INTRAMUSCULAR; INTRAVENOUS at 08:47

## 2024-12-12 RX ADMIN — METOCLOPRAMIDE 10 MG: 5 INJECTION, SOLUTION INTRAMUSCULAR; INTRAVENOUS at 07:20

## 2024-12-12 RX ADMIN — ACETAMINOPHEN 1000 MG: 500 TABLET ORAL at 16:51

## 2024-12-12 RX ADMIN — CEFAZOLIN 2 G: 1 INJECTION, POWDER, FOR SOLUTION INTRAMUSCULAR; INTRAVENOUS at 07:57

## 2024-12-12 RX ADMIN — SODIUM CHLORIDE, SODIUM GLUCONATE, SODIUM ACETATE, POTASSIUM CHLORIDE AND MAGNESIUM CHLORIDE: 526; 502; 368; 37; 30 INJECTION, SOLUTION INTRAVENOUS at 07:48

## 2024-12-12 RX ADMIN — SODIUM CHLORIDE, POTASSIUM CHLORIDE, SODIUM LACTATE AND CALCIUM CHLORIDE 1000 ML: 600; 310; 30; 20 INJECTION, SOLUTION INTRAVENOUS at 14:45

## 2024-12-12 RX ADMIN — SCOPOLAMINE 1 PATCH: 1.5 PATCH, EXTENDED RELEASE TRANSDERMAL at 14:17

## 2024-12-12 RX ADMIN — SODIUM CHLORIDE, SODIUM GLUCONATE, SODIUM ACETATE, POTASSIUM CHLORIDE AND MAGNESIUM CHLORIDE 1000 ML: 526; 502; 368; 37; 30 INJECTION, SOLUTION INTRAVENOUS at 07:19

## 2024-12-12 RX ADMIN — ONDANSETRON 4 MG: 2 INJECTION INTRAMUSCULAR; INTRAVENOUS at 08:47

## 2024-12-12 RX ADMIN — PHENYLEPHRINE HYDROCHLORIDE 0.5 MCG/KG/MIN: 10 INJECTION INTRAVENOUS at 07:50

## 2024-12-12 RX ADMIN — ONDANSETRON 4 MG: 2 INJECTION INTRAMUSCULAR; INTRAVENOUS at 12:56

## 2024-12-12 RX ADMIN — OXYTOCIN 20 UNITS: 10 INJECTION, SOLUTION INTRAMUSCULAR; INTRAVENOUS at 08:21

## 2024-12-12 RX ADMIN — SODIUM CITRATE AND CITRIC ACID MONOHYDRATE 30 ML: 334; 500 SOLUTION ORAL at 07:20

## 2024-12-12 RX ADMIN — LABETALOL HYDROCHLORIDE 300 MG: 300 TABLET, FILM COATED ORAL at 16:51

## 2024-12-12 RX ADMIN — DIPHENHYDRAMINE HYDROCHLORIDE 12.5 MG: 50 INJECTION, SOLUTION INTRAMUSCULAR; INTRAVENOUS at 14:22

## 2024-12-12 RX ADMIN — BUPIVACAINE HYDROCHLORIDE IN DEXTROSE 2 ML: 7.5 INJECTION, SOLUTION SUBARACHNOID at 07:55

## 2024-12-12 RX ADMIN — FAMOTIDINE 20 MG: 10 INJECTION, SOLUTION INTRAVENOUS at 07:20

## 2024-12-12 RX ADMIN — SODIUM CHLORIDE, POTASSIUM CHLORIDE, SODIUM LACTATE AND CALCIUM CHLORIDE 500 ML: 600; 310; 30; 20 INJECTION, SOLUTION INTRAVENOUS at 19:00

## 2024-12-12 RX ADMIN — MORPHINE SULFATE 150 MCG: 0.5 INJECTION, SOLUTION EPIDURAL; INTRATHECAL; INTRAVENOUS at 07:52

## 2024-12-12 RX ADMIN — DEXAMETHASONE SODIUM PHOSPHATE 8 MG: 4 INJECTION INTRA-ARTICULAR; INTRALESIONAL; INTRAMUSCULAR; INTRAVENOUS; SOFT TISSUE at 08:02

## 2024-12-12 ASSESSMENT — FIBROSIS 4 INDEX: FIB4 SCORE: 0.63

## 2024-12-12 ASSESSMENT — LIFESTYLE VARIABLES
AVERAGE NUMBER OF DAYS PER WEEK YOU HAVE A DRINK CONTAINING ALCOHOL: 0
EVER FELT BAD OR GUILTY ABOUT YOUR DRINKING: NO
TOTAL SCORE: 0
EVER HAD A DRINK FIRST THING IN THE MORNING TO STEADY YOUR NERVES TO GET RID OF A HANGOVER: NO
ALCOHOL_USE: NO
HAVE PEOPLE ANNOYED YOU BY CRITICIZING YOUR DRINKING: NO
HAVE YOU EVER FELT YOU SHOULD CUT DOWN ON YOUR DRINKING: NO
CONSUMPTION TOTAL: NEGATIVE
HOW MANY TIMES IN THE PAST YEAR HAVE YOU HAD 5 OR MORE DRINKS IN A DAY: 0
ON A TYPICAL DAY WHEN YOU DRINK ALCOHOL HOW MANY DRINKS DO YOU HAVE: 0
TOTAL SCORE: 0
TOTAL SCORE: 0

## 2024-12-12 ASSESSMENT — SOCIAL DETERMINANTS OF HEALTH (SDOH)
WITHIN THE LAST YEAR, HAVE YOU BEEN KICKED, HIT, SLAPPED, OR OTHERWISE PHYSICALLY HURT BY YOUR PARTNER OR EX-PARTNER?: PATIENT UNABLE TO ANSWER
WITHIN THE LAST YEAR, HAVE YOU BEEN AFRAID OF YOUR PARTNER OR EX-PARTNER?: PATIENT UNABLE TO ANSWER
WITHIN THE PAST 12 MONTHS, YOU WORRIED THAT YOUR FOOD WOULD RUN OUT BEFORE YOU GOT THE MONEY TO BUY MORE: PATIENT UNABLE TO ANSWER
WITHIN THE LAST YEAR, HAVE YOU BEEN HUMILIATED OR EMOTIONALLY ABUSED IN OTHER WAYS BY YOUR PARTNER OR EX-PARTNER?: PATIENT UNABLE TO ANSWER
WITHIN THE LAST YEAR, HAVE TO BEEN RAPED OR FORCED TO HAVE ANY KIND OF SEXUAL ACTIVITY BY YOUR PARTNER OR EX-PARTNER?: PATIENT UNABLE TO ANSWER
IN THE PAST 12 MONTHS, HAS THE ELECTRIC, GAS, OIL, OR WATER COMPANY THREATENED TO SHUT OFF SERVICE IN YOUR HOME?: PATIENT UNABLE TO ANSWER
WITHIN THE PAST 12 MONTHS, THE FOOD YOU BOUGHT JUST DIDN'T LAST AND YOU DIDN'T HAVE MONEY TO GET MORE: PATIENT UNABLE TO ANSWER

## 2024-12-12 ASSESSMENT — PATIENT HEALTH QUESTIONNAIRE - PHQ9
SUM OF ALL RESPONSES TO PHQ9 QUESTIONS 1 AND 2: 0
2. FEELING DOWN, DEPRESSED, IRRITABLE, OR HOPELESS: NOT AT ALL
1. LITTLE INTEREST OR PLEASURE IN DOING THINGS: NOT AT ALL

## 2024-12-12 ASSESSMENT — PAIN SCALES - GENERAL
PAIN_LEVEL: 0
PAINLEVEL: 0 - NO PAIN

## 2024-12-12 NOTE — PROGRESS NOTES
0700 Report received.     0745 All parties ready, pt ambulated to OR2    0857 Pt stable and transferred to PACU    1005 Pt stable and transferred to PP via gurney.     1025 Report given to Drea HALE, POC discussed.

## 2024-12-12 NOTE — ANESTHESIA POSTPROCEDURE EVALUATION
Patient: Federica Acuña    Procedure Summary       Date: 24 Room / Location: [facility] OR 02 / SURGERY LABOR AND DELIVERY    Anesthesia Start: 748 Anesthesia Stop: 906    Procedure: PRIMARY  SECTION (Abdomen) Diagnosis: (FETAL GROWTH RESTRICTION, BREECH FETAL PRESENTATION - 37.3 WEEKS)    Surgeons: Courtney Thompson M.D. Responsible Provider: Blair Wu M.D.    Anesthesia Type: spinal ASA Status: 2            Final Anesthesia Type: spinal  Last vitals  BP   Blood Pressure: 113/76    Temp   36.2 °C (97.2 °F)    Pulse   92   Resp   18    SpO2   95 %      Anesthesia Post Evaluation    Patient location during evaluation: PACU  Patient participation: complete - patient participated  Level of consciousness: awake and alert  Pain score: 0    Airway patency: patent  Anesthetic complications: no  Cardiovascular status: hemodynamically stable  Respiratory status: acceptable  Hydration status: euvolemic    PONV: none          There were no known notable events for this encounter.

## 2024-12-12 NOTE — ANESTHESIA PREPROCEDURE EVALUATION
Case: [id number] Date/Time: 2415    Procedure: PRIMARY  SECTION    Pre-op diagnosis: FETAL GROWTH RESTRICTION, BREECH FETAL PRESENTATION - 37.3 WEEKS    Location: [facility] OR  / SURGERY LABOR AND DELIVERY    Surgeons: Courtney Thompson M.D.            Relevant Problems   NEURO   (positive) Migraine without status migrainosus, not intractable      CARDIAC   (positive) Essential hypertension   (positive) Migraine without status migrainosus, not intractable       Physical Exam    Airway   Mallampati: II  TM distance: >3 FB  Neck ROM: full       Cardiovascular - normal exam  Rhythm: regular  Rate: normal  (-) murmur     Dental - normal exam           Pulmonary - normal exam  Breath sounds clear to auscultation     Abdominal    Neurological - normal exam                   Anesthesia Plan    ASA 2       Plan - spinal   Neuraxial block will be primary anesthetic                Postoperative Plan: Postoperative administration of opioids is intended.    Pertinent diagnostic labs and testing reviewed    Informed Consent:    Anesthetic plan and risks discussed with patient.    Use of blood products discussed with: patient whom.

## 2024-12-12 NOTE — L&D DELIVERY NOTE
OB  Delivery Note    2024  Federica Acuña  33 y.o.    Review the Delivery Report for details.     Gestational Age: 37w3d  /Para:   Labor Complications: None  Estimated Blood Loss:   Delivery Blood Loss   Intrapartum & Postpartum: 24 0813 - 24 0909    Delivery Admission: 24 0533 - 2409         Intrapartum & Postpartum Delivery Admission    Est. Blood Loss Anesthesia 700 mL 700 mL    Total  700 mL 700 mL          Delivery Type: , Low Transverse  ROM to Delivery Time: 0h 02m  [place] Sex: Male  [place] Weight: 2.44 kg (5 lb 6.1 oz)   1 Minute 5 Minute 10 Minute   Apgar Totals: 8   9           Details    Pre-Op Diagnosis: 1. Intrauterine pregnancy at 37w3d  2. Breech  3. Severe FGR  4.cHTN   Delivery Justification Patient was admitted to Labor and Delivery at 37w3d for primary    Post-Op Diagnosis: 1. Same, delivered   Indications:  Breech   Procedure: Primary , Low Transverse via Low Transverse incision   Staff Surgeon(s):  BRANDON Johnson M.D.  Circulator: Yelitza Morales R.N.  Scrub Person: Tamara Ho  L&ROWAN Baby  Nurse: Christine Linton R.N.   Anesthesia: Spinal. Blair Wu MD   Findings: Male infant delivered, weighing 2.44 kg (5 lb 6.1 oz). Normal uterus, tubes, and ovaries.     Informed Consent:  The risks, benefits, complications, and alternatives were discussed with the patient. The patient understood that the risks of  section include, but are not limited to: injury to nearby structures or organs, infection, blood loss and possible need for transfusion, and potential need for more surgery including hysterectomy. The patient stated understanding and desired to proceed. All questions were answered. The site of surgery was properly noted and marked. The patient was identified as Federica Acuña and the procedure verified as a  delivery. A Time Out was held  and the above information confirmed.    Procedure Details:  After adequate Spinal anesthesia was ascertained, the patient was prepped and draped in a normal supine position with a wedge under the right hip.  A pfannensteil incision was made.  The incision was taken down to the fascia, which was incised medial to lateral.  The rectus muscles were dissected off the rectus sheath.  There was separation of the rectus sheath superiorly and the peritoneum was entered atraumatically, extended superiorly and inferiorly.  An Nasim O retractor was placed. The lower uterine segment was identified.  A low transverse incision was made in the uterus.  It was then extended digitally and the membranes were entered with clear fluid.  The infant was born in a rupa breech position.  Delivered in the classical manner.  It was a Living Male infant.  Placenta removal: Expressed. Placental disposition: registry. The uterus was repaired in situ with an nasim-o retractor, cleansed of all clots and membranes.  Attention was made towards closing the uterus in a 2-layer fashion with 0 Monocryl suture ligature in a running interlocking stitch with hemostatis assured.  The gutters were cleansed of all clots.  Tubal ligation was not performed.  Normal tubes and ovaries were noted.  The fascia was closed with 0 Vicryl going right to left, left to right, crossing in the midline with a running interlocking stitch.  The subcutaneous tissues was copiously irrigated.  Small capillary bleeders individually coagulated.  The subcutaneous tissues were approximated with running 2-0 plain and the skin with subcuticular suture with 3-0 Vicryl.    The patient tolerated the procedure well. Sponge, instrument, and needle counts were correct and the patient was taken to the recovery room in good and stable condition.    Courtney Thompson M.D.

## 2024-12-12 NOTE — ANESTHESIA TIME REPORT
Anesthesia Start and Stop Event Times       Date Time Event    12/12/2024 0721 Ready for Procedure     0748 Anesthesia Start     0906 Anesthesia Stop          Responsible Staff  12/12/24      Name Role Begin End    Blair Wu M.D. Anesth 0748 0906          Overtime Reason:  no overtime (within assigned shift)    Comments:

## 2024-12-12 NOTE — PROGRESS NOTES
0558-Pt is a  at 37.3 with an EDC of  presenting to OB ED for a scheduled C/S. Denies LOF ctx VB, +FM. EFM and TOCO applied, VS taken. Pt is resting comfortably at bedside with call light in reach.    0700-Report given to Yelitza HALE. POC discussed, all questions have been answered at this time, care relinquished.

## 2024-12-12 NOTE — ANESTHESIA PROCEDURE NOTES
Spinal Block    Date/Time: 12/12/2024 7:52 AM    Performed by: Blair Wu M.D.  Authorized by: Blair uW M.D.    Start Time:  12/12/2024 7:52 AM  End Time:  12/12/2024 7:57 AM  Reason for Block: primary anesthetic    patient identified, IV checked, site marked, risks and benefits discussed, surgical consent, monitors and equipment checked, pre-op evaluation and timeout performed    Patient Position:  Sitting  Prep: ChloraPrep, patient draped and sterile technique    Monitoring:  Blood pressure, continuous pulse oximetry and heart rate  Approach:  Midline  Location:  L3-4  Injection Technique:  Single-shot  Skin infiltration:  Lidocaine  Strength:  1%  Dose:  3ml  Needle Type:  Pencan  Needle Gauge:  25 G  CSF flowing pre/post injection:  Yes  Sensory Level:  T4

## 2024-12-12 NOTE — H&P
Obstetrics History and Physical    CC: scheduled primary CS    HPI:  Ms. Federica Acuña is a 33 y.o.  @ 37w3d by LMP c/w 10 US with severe FGR, breech presentation    PNC with [facility], Dr Thompson    Pregnancy complicated by:  FGR diagnosed at 28 wks, followed by HRPC, normal dopplers. Most recent growth US  37wk US EFW 2286g (3%), AC <5%  Breech presentation  Chronic HTN on labetalol 300mg BID    PNL:  Rh pos, RI, HIV neg, RPR NR, HBsAg NR, GC/CT neg/neg  GBS pending    OB History    Para Term  AB Living   1             SAB IAB Ectopic Molar Multiple Live Births                    # Outcome Date GA Lbr Jacob/2nd Weight Sex Type Anes PTL Lv   1 Current                GYN: denies STIs, no cervical procedures    Past Medical History:   Diagnosis Date    Dyslipidemia 2018    Hypertension     Pregnant     Pyloric stenosis        Past Surgical History:   Procedure Laterality Date    ABDOMINAL EXPLORATION      pyloric stenosis surgery, as a baby       No Known Allergies    Prior to Admission Medications   Prescriptions Last Dose Informant Patient Reported? Taking?   Cholecalciferol (VITAMIN D-3 PO) 2024 at 10:00 AM Patient Yes Yes   Sig: Take 1 Tablet by mouth every day.   Prenatal Vit-Fe Fumarate-FA (PRENATAL VITAMIN PO) 2024 at 10:00 AM Patient Yes Yes   Sig: Take 1 Tablet by mouth every day.   aspirin 81 MG EC tablet 2024 at 10:00 AM Patient Yes Yes   Sig: Take 81 mg by mouth every day.   labetalol (NORMODYNE) 300 MG Tab 2024 at  5:00 AM Patient No Yes   Sig: Take 1 Tablet by mouth 2 times a day.      Facility-Administered Medications: None       Family History   Problem Relation Age of Onset    Hypertension Mother     Hyperlipidemia Mother     Hypertension Father     Cancer Maternal Grandmother 27        breast    Breast Cancer Maternal Grandmother        Social History     Socioeconomic History    Marital status:     Highest education level:  Associate degree: occupational, technical, or vocational program   Tobacco Use    Smoking status: Never     Passive exposure: Current    Smokeless tobacco: Never    Tobacco comments:     Second hand exposure   Vaping Use    Vaping status: Never Used   Substance and Sexual Activity    Alcohol use: Not Currently    Drug use: No    Sexual activity: Yes     Partners: Male     Birth control/protection: Pill     Comment: Currently trying to concieve     Social Drivers of Health     Financial Resource Strain: Low Risk  (1/31/2024)    Overall Financial Resource Strain (CARDIA)     Difficulty of Paying Living Expenses: Not very hard   Food Insecurity: Patient Unable To Answer (12/12/2024)    Hunger Vital Sign     Worried About Running Out of Food in the Last Year: Patient unable to answer     Ran Out of Food in the Last Year: Patient unable to answer   Transportation Needs: No Transportation Needs (12/12/2024)    PRAPARE - Transportation     Lack of Transportation (Medical): No     Lack of Transportation (Non-Medical): No   Physical Activity: Insufficiently Active (1/31/2024)    Exercise Vital Sign     Days of Exercise per Week: 4 days     Minutes of Exercise per Session: 30 min   Stress: No Stress Concern Present (1/31/2024)    [place] [employer] [employer] - Occupational Stress Questionnaire     Feeling of Stress : Only a little   Social Connections: Moderately Isolated (1/31/2024)    Social Connection and Isolation Panel [NHANES]     Frequency of Communication with Friends and Family: More than three times a week     Frequency of Social Gatherings with Friends and Family: Twice a week     Attends Zoroastrian Services: Never     Active Member of Clubs or Organizations: No     Attends Club or Organization Meetings: Never     Marital Status:    Intimate Partner Violence: Patient Unable To Answer (12/12/2024)    Humiliation, Afraid, Rape, and Kick questionnaire     Fear of Current or Ex-Partner: Patient unable  "to answer     Emotionally Abused: Patient unable to answer     Physically Abused: Patient unable to answer     Sexually Abused: Patient unable to answer   Housing Stability: Unknown (2024)    Housing Stability Vital Sign     Unable to Pay for Housing in the Last Year: No     Homeless in the Last Year: No       PE:  Vitals:    24 0602   BP: 113/76   Pulse: 92   Resp: 18   Temp: 36.2 °C (97.2 °F)   TempSrc: Temporal   SpO2: 95%   Weight: 95.7 kg (211 lb)   Height: 1.727 m (5' 8\")     gen: AAO, NAD  abd: soft, gravid, NT  Ext: NT, no edema    Sterile Vaginal Exam:  deferred  Fetal Heart Tracing: Baseline Rate: 120 bpm Variability: 6-25 Accelerations: Present Decels: Absent Mode: External US    [place]: Mode: External [place] Contraction Frequency: x3    A/P: 33 y.o.  @ 37w3d by LMP c/w 10 wk US with severe FGR, delivery recommended by [facility], breech presentation  -Admit  -Standard labs  -Moderate hemorrhage risk    Courtney Thompson MD  [facility]      "

## 2024-12-12 NOTE — PROGRESS NOTES
"POD# 1    S: Doing well. Had a lot of post op nausea, now improved and tolerating PO well. Evans out and has voided. Lochia normal. Pain controlled    O:   /73   Pulse 75   Temp 36.8 °C (98.3 °F) (Temporal)   Resp 16   Ht 1.727 m (5' 8\")   Wt 95.7 kg (211 lb)   SpO2 96%   Gen: NAD  Abdomen: Fundus firm below umbilicus. No TTP  Incision: Mepilex in place. C/d/i  Ext: no C/c/e    RH: positive  GBS: pending  Rubella: immune    Labs:   Recent Labs     24  0625 24  2104   WBC 11.0* 13.4*   RBC 4.83 4.11*   HEMOGLOBIN 12.2 10.8*   HEMATOCRIT 37.5 31.8*   MCV 77.6* 77.4*   MCH 25.3* 26.3*   RDW 40.0 40.2   PLATELETCT 285 283   MPV 10.8 11.3   NEUTSPOLYS 68.10  --    LYMPHOCYTES 22.70  --    MONOCYTES 7.00  --    EOSINOPHILS 1.30  --    BASOPHILS 0.50  --        A/P 34yo  s/p LTCS for Breech, IUGR @ 37w2d  CHTN  EBL: 750cc  Complications: none apparent   -  Routine post op care. Initially with low UOP, now resolved.   CHTN  -Continue home Labetalol 300mg BID  -No current s/sx of preE  3. Anemia: due to acute blood loss. Asymptomatic  4. Home in 1-3 days       Denise Whelan MD   "

## 2024-12-13 PROCEDURE — A9270 NON-COVERED ITEM OR SERVICE: HCPCS | Performed by: ANESTHESIOLOGY

## 2024-12-13 PROCEDURE — 770002 HCHG ROOM/CARE - OB PRIVATE (112)

## 2024-12-13 PROCEDURE — 700111 HCHG RX REV CODE 636 W/ 250 OVERRIDE (IP): Mod: JZ | Performed by: ANESTHESIOLOGY

## 2024-12-13 PROCEDURE — 700102 HCHG RX REV CODE 250 W/ 637 OVERRIDE(OP): Performed by: OBSTETRICS & GYNECOLOGY

## 2024-12-13 PROCEDURE — A9270 NON-COVERED ITEM OR SERVICE: HCPCS | Performed by: OBSTETRICS & GYNECOLOGY

## 2024-12-13 PROCEDURE — 700102 HCHG RX REV CODE 250 W/ 637 OVERRIDE(OP): Performed by: ANESTHESIOLOGY

## 2024-12-13 RX ADMIN — ACETAMINOPHEN 1000 MG: 500 TABLET ORAL at 00:28

## 2024-12-13 RX ADMIN — IBUPROFEN 800 MG: 800 TABLET, FILM COATED ORAL at 22:13

## 2024-12-13 RX ADMIN — ACETAMINOPHEN 1000 MG: 500 TABLET ORAL at 17:59

## 2024-12-13 RX ADMIN — LABETALOL HYDROCHLORIDE 300 MG: 300 TABLET, FILM COATED ORAL at 06:15

## 2024-12-13 RX ADMIN — ACETAMINOPHEN 1000 MG: 500 TABLET ORAL at 12:21

## 2024-12-13 RX ADMIN — LABETALOL HYDROCHLORIDE 300 MG: 300 TABLET, FILM COATED ORAL at 17:59

## 2024-12-13 RX ADMIN — IBUPROFEN 800 MG: 800 TABLET, FILM COATED ORAL at 12:21

## 2024-12-13 RX ADMIN — ACETAMINOPHEN 1000 MG: 500 TABLET ORAL at 06:15

## 2024-12-13 ASSESSMENT — PAIN DESCRIPTION - PAIN TYPE
TYPE: ACUTE PAIN;SURGICAL PAIN
TYPE: ACUTE PAIN;SURGICAL PAIN
TYPE: ACUTE PAIN
TYPE: ACUTE PAIN
TYPE: SURGICAL PAIN

## 2024-12-13 ASSESSMENT — EDINBURGH POSTNATAL DEPRESSION SCALE (EPDS)
I HAVE BEEN SO UNHAPPY THAT I HAVE HAD DIFFICULTY SLEEPING: NOT AT ALL
THINGS HAVE BEEN GETTING ON TOP OF ME: YES, SOMETIMES I HAVEN'T BEEN COPING AS WELL AS USUAL
I HAVE BLAMED MYSELF UNNECESSARILY WHEN THINGS WENT WRONG: NO, NEVER
I HAVE BEEN SO UNHAPPY THAT I HAVE BEEN CRYING: NO, NEVER
THE THOUGHT OF HARMING MYSELF HAS OCCURRED TO ME: NEVER
I HAVE BEEN ABLE TO LAUGH AND SEE THE FUNNY SIDE OF THINGS: AS MUCH AS I ALWAYS COULD
I HAVE LOOKED FORWARD WITH ENJOYMENT TO THINGS: AS MUCH AS I EVER DID
I HAVE FELT SCARED OR PANICKY FOR NO GOOD REASON: NO, NOT AT ALL
I HAVE BEEN ANXIOUS OR WORRIED FOR NO GOOD REASON: HARDLY EVER
I HAVE FELT SAD OR MISERABLE: NO, NOT AT ALL

## 2024-12-13 NOTE — CARE PLAN
The patient is Stable - Low risk of patient condition declining or worsening    Shift Goals  Clinical Goals: Maintain fundus firm, lochia light; pain management; pt to ambulate  Patient Goals: pain management; work on breastfeeding    Progress made toward(s) clinical / shift goals:  Fundus firm, lochia scant; pt reported pain relief with scheduled pain medication; pt able to ambulate to bathroom for maria del rosario-care and catheter care.  Pt assisted with breastfeeding positioning by RN and lactation consultant.    Patient is not progressing towards the following goals:    Problem: Altered Physiologic Condition  Goal: Patient physiologically stable as evidenced by normal lochia, palpable uterine involution and vitals within normal limits  12/12/2024 2206 by Samir Hogan, R.N.  Outcome: Not Progressing  Note:  Elevated blood pressures this shift.  MD aware.  No new orders.

## 2024-12-13 NOTE — CARE PLAN
The patient is Stable - Low risk of patient condition declining or worsening    Shift Goals  Clinical Goals: Maintain fundus firm, lochia light; pain management; pt to ambulate  Patient Goals: pain management; work on breastfeeding    Progress made toward(s) clinical / shift goals:  Fundus firm, lochia scant; pt reported pain relief with scheduled pain medications; pt able to ambulate to bathroom for maria del rosario-care and catheter care.  Pt assisted with breastfeeding positioning by RN and lactation consultant

## 2024-12-13 NOTE — LACTATION NOTE
This note was copied from a baby's chart.  Mom is a 32 y/o P1 who delivered baby boy weighing 5 # 6.1 oz at 37.3 wks Mom reports breast changes during pregnancy. Mom denies any breast surgeries, diabetes, thyroid or fertility issues. Mom does have CHTN.  Baby has been fed bottles due to mother not being able to latch baby. Nipples are inverted and she has been using the hand pump to try to erin nipple before latch. LC demonstrated hand expression and suggested expressing onto nipple and allow baby to lick.   LC discussed starting a nipple shield and a 3 step plan.   LC demonstrated Nipple Shield use and cleaning and baby was able to latch baby easily.   Baby had a rhythmic jaw glide noted. LC placed formula behind nipple shield and reviewed effective feeding pattern. Swallows were heard   Pump parts and gray bin were placed in room and bedside RN will set up at next step feed.   LC discussed volumes for supplementing and parents have guidelines.  3  step feeding steps were written on white.  Mom has a pump at home

## 2024-12-13 NOTE — LACTATION NOTE
This note was copied from a baby's chart.  Initial Consult:     History:   pc/s for breech presentation.  Baby SGA at 4wph9rz.  Baby  has been cold x2 requiring radiant warmer, blood sugar's have been stable.     Breastfeeding Assistance: Provided breastfeeding assistance with: positioning of baby at the left breast in the football position.  MOB was taught to place baby tummy to tummy and nipple to nose, wedging of the breast, and how to achieve deep latch.  Demonstrated and taught MOB how to perform hand expression, MOB able to hand express colostrum independently. Drops of colostrum expressed, baby sleepy, but licking colostrum. Consider nipple shield after baby is 24hrs old.      Provided breastfeeding education on: infant behavior less than 24hrs old,  hunger cues, frequency/duration of breastfeeds, skin to skin, and nutritive vs non-nutritive suck.      Breastfeeding Plan:      First 24hrs:  Encourage breastfeeding on demand based on early feeding cues at least 8x in 24hrs.  Unswaddle and wake baby to breastfeed if last feed was 3 hrs prior.  Continue to hand express prior to latch. Frequent skin to skin with MOB/FOB when awake and attentive, skin to skin promotes bonding and breastfeeding success. If baby returns to NBN for rewarming or requires supplementation for low blood sugar, begin mom pumping with Ameda Platinum double pump q3hrs.

## 2024-12-13 NOTE — PROGRESS NOTES
1013- Patient arrived to Room S339.  Report received from MARIEL Torre RN.  1030- Patient assessment done.  IV saline locked.  Discussed pain management with patient, to include MD orders for scheduled pain medications.  Patient oriented to room and call system.  Reviewed plan of care.  Patient verbalized understanding.  FOB at bedside.  1140- Patient c/o nausea/vomiting and had an emesis of 300 mls.  1256- Zofran given per MD order for c/o emesis after update received from SHABNAM Blount, that patient had an emesis of 150 mls.  1350- Patient had an emesis of 100 mls.  Dr. Wu notified of patient's total emesis since transfer to postpartum and that patient had received Zofran.  Telephone order received for a scopolamine patch.  Telephone order received to give one liter of LR as a bolus.  Update given to patient.  Patient reported that her IV is uncomfortable and would like infusion to be run at a slower rate.  Call placed to Dr. Wu.  Telephone order received to change previous order to give one liter of LR at a rate of 250 ml/hr.  1445- IV saline lock flushed.  IV patent.  No complaints of pain at IV site.  IV infusion of LR at 250 ml/hr started.    1708- Dr. Whelan notified of patient's blood pressures this shift.  No new orders.  1800- Patient up to the bathroom.  Patient denied dizziness and ambulated with hand held assist.  Patient ambulated with a shuffled gait.  Zamzam-care and catheter care done.  Pads changed.  Patient assisted to place underwear on.  Patient tolerated well.  1823- Dr. Whelan notified of patient's emesis this shift and patient's urine output this shift., and that patient has LR currently infusing at 250 ml/hr.  Telephone order to give one liter of LR, and to start LR as a 500 ml bolus, then continue LR at 125 ml/hr until completed.  Telephone order received to notify MD for urine output less than 30 ml/hr.  1900- IV bolus of 500 mls LR initiated.  1915- Report given to ALEXIA Sarabia, who  assumed care of patient.

## 2024-12-13 NOTE — PROGRESS NOTES
Assumed care of patient at change of shift. Discussed POC and answered all questions.     1430- Instructed patient on the use of the electric breast pump. Settings: speed 80 x 2 minutes, then 60 x 13 minutes; suction 20-30%. Pump for a total of 15 minutes every 3 hours. Instructed patient on cleaning pump parts and provided soap, and sponges. Patient stickers provided and instructed patient to write date and time of each pumping on milk stickers.

## 2024-12-13 NOTE — CARE PLAN
The patient is Stable - Low risk of patient condition declining or worsening    Shift Goals  Clinical Goals: Pain control  Patient Goals: feed baby  Family Goals: support    Progress made toward(s) clinical / shift goals:  Pain well controlled     Patient is not progressing towards the following goals:

## 2024-12-13 NOTE — CARE PLAN
Problem: Knowledge Deficit - Postpartum  Goal: Patient will verbalize and demonstrate understanding of self and infant care  Outcome: Progressing     Problem: Psychosocial - Postpartum  Goal: Patient will verbalize and demonstrate effective bonding and parenting behavior  Outcome: Progressing     Problem: Altered Physiologic Condition  Goal: Patient physiologically stable as evidenced by normal lochia, palpable uterine involution and vitals within normal limits  Outcome: Progressing   The patient is Stable - Low risk of patient condition declining or worsening    Shift Goals  Clinical Goals: stable VS and lochia WNL  Patient Goals: feed baby  Family Goals: support    Progress made toward(s) clinical / shift goals:    Pt reports comfort after pain interventions, Fundus firm and lochia light, VSS, educated on POC, needs met at this time. Questions answered. Will continue to educate.

## 2024-12-14 PROCEDURE — 770002 HCHG ROOM/CARE - OB PRIVATE (112)

## 2024-12-14 PROCEDURE — 700102 HCHG RX REV CODE 250 W/ 637 OVERRIDE(OP): Performed by: OBSTETRICS & GYNECOLOGY

## 2024-12-14 PROCEDURE — A9270 NON-COVERED ITEM OR SERVICE: HCPCS | Performed by: OBSTETRICS & GYNECOLOGY

## 2024-12-14 RX ADMIN — IBUPROFEN 800 MG: 800 TABLET, FILM COATED ORAL at 21:59

## 2024-12-14 RX ADMIN — ACETAMINOPHEN 1000 MG: 500 TABLET ORAL at 00:01

## 2024-12-14 RX ADMIN — IBUPROFEN 800 MG: 800 TABLET, FILM COATED ORAL at 13:54

## 2024-12-14 RX ADMIN — ACETAMINOPHEN 1000 MG: 500 TABLET ORAL at 05:29

## 2024-12-14 RX ADMIN — LABETALOL HYDROCHLORIDE 300 MG: 300 TABLET, FILM COATED ORAL at 18:05

## 2024-12-14 RX ADMIN — IBUPROFEN 800 MG: 800 TABLET, FILM COATED ORAL at 05:29

## 2024-12-14 RX ADMIN — ACETAMINOPHEN 1000 MG: 500 TABLET ORAL at 12:00

## 2024-12-14 RX ADMIN — ACETAMINOPHEN 1000 MG: 500 TABLET ORAL at 18:05

## 2024-12-14 ASSESSMENT — PAIN DESCRIPTION - PAIN TYPE
TYPE: ACUTE PAIN;SURGICAL PAIN
TYPE: ACUTE PAIN;SURGICAL PAIN
TYPE: SURGICAL PAIN
TYPE: ACUTE PAIN;SURGICAL PAIN

## 2024-12-14 NOTE — PROGRESS NOTES
POD#2  S) pt without c/o  O) 126/91  Inc: c/d/i, mepilex covering  Ext: no edema  Hgb: 10.8  A/P POD#2, s/p primary ltcs for breech and iugr (37wks)   - stable, continue orders   - probable d/c tomorrow  CHTN - bp stable on labetolol 300mg bid

## 2024-12-14 NOTE — CARE PLAN
The patient is Stable - Low risk of patient condition declining or worsening    Shift Goals  Clinical Goals: Pain control, Vitals signs WDL, Lochia WDL  Patient Goals:   Family Goals:    Progress made toward(s) clinical / shift goals:    Problem: Early Mobilization - Post Surgery  Goal: Early mobilization post surgery  Outcome: Progressing  Note: Patient ambulates around room with a steady gait and tolerates well through out this shift.     Problem: Altered Physiologic Condition  Goal: Patient physiologically stable as evidenced by normal lochia, palpable uterine involution and vitals within normal limits    Outcome: Progressing  Note: Patient's fundus firm, one below umbilicus with scant bleeding through out this shift.  12/13/2024 2021 by Bri Dorantes R.N.

## 2024-12-14 NOTE — PROGRESS NOTES
1945- Assessment completed. Fundus firm, lochia light. Abd. incisions with mepilex silver dressing intact. Plan of care reviewed. Denies pain at this time, will call if pain med intervention needed. Call light within reach, bed at lowest position, and shows no signs of distress at this time.    0529- Patient's /77 and HR 73, per parameters holding Labetalol d/t.     0552- CNA's /87 and HR 82    0610- This RN took patient's BP twice and BP at 105/67 and HR 63. Labetalol held.

## 2024-12-14 NOTE — LACTATION NOTE
This note was copied from a baby's chart.  Met with Federica for a follow up lactation consultation. She has been pumping and proving colostrum and supplementing with formula. She reports that the pump is comfortable and she has been getting consistent small volumes of colostrum each time she pumps.     She asked about burping techniques, and these were demonstrated to her.     She has a breast pump at home, and will continue pumping and providing when she is discharged.     Federica was provided with the opportunity to ask questions and these were answered to her satisfaction.     Plan: continue pumping every three hours. Attempt to latch as desired. Supplement according to supplemental feeding guidelines, with pumped breast milk and/or formula.

## 2024-12-15 ENCOUNTER — PHARMACY VISIT (OUTPATIENT)
Dept: PHARMACY | Facility: MEDICAL CENTER | Age: 34
End: 2024-12-15
Payer: COMMERCIAL

## 2024-12-15 PROCEDURE — 770002 HCHG ROOM/CARE - OB PRIVATE (112)

## 2024-12-15 PROCEDURE — A9270 NON-COVERED ITEM OR SERVICE: HCPCS | Performed by: OBSTETRICS & GYNECOLOGY

## 2024-12-15 PROCEDURE — RXMED WILLOW AMBULATORY MEDICATION CHARGE: Performed by: OBSTETRICS & GYNECOLOGY

## 2024-12-15 PROCEDURE — 700102 HCHG RX REV CODE 250 W/ 637 OVERRIDE(OP): Performed by: OBSTETRICS & GYNECOLOGY

## 2024-12-15 RX ORDER — IBUPROFEN 800 MG/1
TABLET, FILM COATED ORAL
Qty: 30 TABLET | Refills: 1 | Status: SHIPPED | OUTPATIENT
Start: 2024-12-15 | End: 2024-12-25

## 2024-12-15 RX ORDER — OXYCODONE HYDROCHLORIDE 5 MG/1
5 TABLET ORAL EVERY 8 HOURS PRN
Qty: 15 TABLET | Refills: 0 | Status: SHIPPED | OUTPATIENT
Start: 2024-12-15 | End: 2024-12-20

## 2024-12-15 RX ORDER — ACETAMINOPHEN 500 MG
TABLET ORAL
Qty: 30 TABLET | Refills: 0 | Status: SHIPPED | OUTPATIENT
Start: 2024-12-15 | End: 2024-12-25

## 2024-12-15 RX ORDER — PSEUDOEPHEDRINE HCL 30 MG
100 TABLET ORAL 2 TIMES DAILY PRN
Qty: 60 CAPSULE | Refills: 3 | Status: SHIPPED | OUTPATIENT
Start: 2024-12-15

## 2024-12-15 RX ADMIN — LABETALOL HYDROCHLORIDE 300 MG: 300 TABLET, FILM COATED ORAL at 17:36

## 2024-12-15 RX ADMIN — DOCUSATE SODIUM 100 MG: 100 CAPSULE, LIQUID FILLED ORAL at 22:04

## 2024-12-15 RX ADMIN — IBUPROFEN 800 MG: 800 TABLET, FILM COATED ORAL at 22:03

## 2024-12-15 RX ADMIN — IBUPROFEN 800 MG: 800 TABLET, FILM COATED ORAL at 05:49

## 2024-12-15 RX ADMIN — ACETAMINOPHEN 1000 MG: 500 TABLET ORAL at 22:04

## 2024-12-15 RX ADMIN — ACETAMINOPHEN 1000 MG: 500 TABLET ORAL at 05:49

## 2024-12-15 RX ADMIN — IBUPROFEN 800 MG: 800 TABLET, FILM COATED ORAL at 13:20

## 2024-12-15 RX ADMIN — ACETAMINOPHEN 1000 MG: 500 TABLET ORAL at 13:19

## 2024-12-15 RX ADMIN — ACETAMINOPHEN 1000 MG: 500 TABLET ORAL at 00:03

## 2024-12-15 ASSESSMENT — PAIN DESCRIPTION - PAIN TYPE
TYPE: SURGICAL PAIN
TYPE: ACUTE PAIN;SURGICAL PAIN
TYPE: SURGICAL PAIN
TYPE: ACUTE PAIN

## 2024-12-15 NOTE — LACTATION NOTE
Bedside and Verbal shift change report given to Raciel Menard (oncoming nurse) by Leo Trejo (offgoing nurse). Report included the following information SBAR, Kardex, Procedure Summary, Intake/Output, Accordion, Recent Results and Cardiac Rhythm NSR. This note was copied from a baby's chart.  Follow up:    MOB has continued 3 step plan, pumping is comfortable and starting to see increase milk.  MOB has MomCozy wearable pump and home and is considering renting HG pump. Information provided.    Discussed engorgement physiology and techniques to relieve discomfort including:  gentle massage, hand expression and reverse pressure softening prior to latch.  Cool compress after latching/pumping.  Ibuprofen prn to relieve inflammation and pain.      Plan:  Continue 3 step plan, if latch at breast is desired, reviewed outpatient LC resources and how to use nipple shield.

## 2024-12-15 NOTE — DISCHARGE SUMMARY
Discharge Summary:      Federica Acuña    Admit Date:   2024  Discharge Date:  12/15/2024     Admitting diagnosis:  Labor and delivery, indication for care [O75.9]  Discharge Diagnosis: Status post  for breech and FGR  Pregnancy Complications: chronic hypertension  Tubal Ligation:  no        History:  Past Medical History:   Diagnosis Date    Dyslipidemia 2018    Hypertension     Pregnant     Pyloric stenosis      OB History    Para Term  AB Living   1 1 1     1   SAB IAB Ectopic Molar Multiple Live Births           0 1      # Outcome Date GA Lbr Jacob/2nd Weight Sex Type Anes PTL Lv   1 Term 24 37w3d  2.44 kg (5 lb 6.1 oz) M CS-LTranv Spinal N PENELOPE        Patient has no known allergies.  Patient Active Problem List    Diagnosis Date Noted    Essential hypertension 2020    Migraine without status migrainosus, not intractable 2019    Obesity (BMI 30-39.9) 2018    Nervousness 2018    Encounter for surveillance of contraceptive pills 2018        Hospital Course:   33 y.o. , now para 1, was admitted with the above mentioned diagnosis, underwent Primary  breech,  for breech. Patient postpartum course was unremarkable, with progressive advancement in diet , ambulation and toleration of oral analgesia. Patient without complaints today and desires discharge.      Vitals:    24 2143 12/15/24 0212 12/15/24 0530 12/15/24 0553   BP: 117/74 126/88 123/85 116/70   Pulse: 82 76 72    Resp: 16 18 18    Temp: 36.7 °C (98.1 °F) 36.2 °C (97.1 °F) 36.2 °C (97.2 °F)    TempSrc: Temporal Temporal Temporal    SpO2: 95% 96% 96%    Weight:       Height:           Current Facility-Administered Medications   Medication Dose    NS infusion      miSOPROStol (Cytotec) tablet 800 mcg  800 mcg    carboPROST (Hemabate) injection 250 mcg  250 mcg    labetalol (Normodyne) tablet 300 mg  300 mg    lactated ringers infusion  2,000 mL     ibuprofen (Motrin) tablet 800 mg  800 mg    Followed by    [START ON 12/16/2024] ibuprofen (Motrin) tablet 800 mg  800 mg    acetaminophen (Tylenol) tablet 1,000 mg  1,000 mg    Followed by    [START ON 12/16/2024] acetaminophen (Tylenol) tablet 1,000 mg  1,000 mg    oxyCODONE immediate-release (Roxicodone) tablet 5 mg  5 mg    oxyCODONE immediate-release (Roxicodone) tablet 10 mg  10 mg    ondansetron (Zofran) syringe/vial injection 4 mg  4 mg    Or    ondansetron (Zofran ODT) dispertab 4 mg  4 mg    diphenhydrAMINE (Benadryl) tablet/capsule 25 mg  25 mg    Or    diphenhydrAMINE (Benadryl) injection 25 mg  25 mg    docusate sodium (Colace) capsule 100 mg  100 mg    NS infusion      oxytocin (Pitocin) infusion (for post delivery)  125 mL/hr    miSOPROStol (Cytotec) tablet 800 mcg  800 mcg    carboPROST (Hemabate) injection 250 mcg  250 mcg    labetalol (Normodyne/Trandate) injection 10 mg  10 mg    simethicone (Mylicon) chewable tablet 125 mg  125 mg    calcium carbonate (Tums) chewable tab 1,000 mg  1,000 mg    scopolamine (Transderm-Scop) patch 1 Patch  1 Patch       Exam:  Exam:  Gen: A&Ox3 NAD  CV: no edema or cyanosis  Resp: normal effort  Abd: soft, NT, FF and below U. Mepilex CDI no shadowing  Ext: no edema or TTP  Psych: normal mood and affect       Labs:  Recent Labs     12/12/24 2104   WBC 13.4*   RBC 4.11*   HEMOGLOBIN 10.8*   HEMATOCRIT 31.8*   MCV 77.4*   MCH 26.3*   MCHC 34.0   RDW 40.2   PLATELETCT 283   MPV 11.3        Activity:   Discharge to home  Pelvic Rest x 6 weeks    Assessment:  normal postpartum course  Discharge Assessment: stable for discharge     Follow up: Dr. Thompson in 1 week for BP check and incision check and again in 6 weeks for postpartum exam     Discharge Meds:   Current Outpatient Medications   Medication Sig Dispense Refill    acetaminophen (TYLENOL) 500 MG Tab Take 2 Tablets by mouth every 6 hours for 5 days, THEN 2 Tablets every 6 hours as needed for Mild Pain for up to 5  days. 30 Tablet 0    docusate sodium 100 MG Cap Take 100 mg by mouth 2 times a day as needed for Constipation. 60 Capsule 3    ibuprofen (MOTRIN) 800 MG Tab Take 1 Tablet by mouth every 8 hours for 5 days, THEN 1 Tablet every 8 hours as needed for Mild Pain or Moderate Pain for up to 5 days. 30 Tablet 1    oxyCODONE immediate-release (ROXICODONE) 5 MG Tab Take 1 Tablet by mouth every 8 hours as needed for Severe Pain for up to 5 days. 15 Tablet 0       Elizabeth Orozco D.O.

## 2024-12-15 NOTE — CARE PLAN
The patient is Stable - Low risk of patient condition declining or worsening    Shift Goals  Clinical Goals: Lochia WNL, VSS  Patient Goals: breastfeeding  Family Goals: support    Progress made toward(s) clinical / shift goals:    Problem: Psychosocial - Postpartum  Goal: Patient will verbalize and demonstrate effective bonding and parenting behavior  Outcome: Progressing  Note: Pt is demonstrating effective bonding behavior with infant. Pt responds to infant cues, and performs cares independently.      Problem: Altered Physiologic Condition  Goal: Patient physiologically stable as evidenced by normal lochia, palpable uterine involution and vitals within normal limits  Outcome: Progressing  Note: VSS, lochia WNL, uterus is palpable and involuting appropriately.        Patient is not progressing towards the following goals:

## 2024-12-15 NOTE — PROGRESS NOTES
1910: Report received from ALEXIA Waldrop. Assumed care of pt.     2150: Assessment complete. Education on call light and emergency light. Reinforced expected lochia color and amount and when to call RN. Reinforced safe sleep, skin to skin, bundling in open crib, and feeding frequency and duration for infant. Reviewed plan of care, all questions answered at this time.

## 2024-12-16 LAB
ALBUMIN SERPL BCP-MCNC: 3.2 G/DL (ref 3.2–4.9)
ALBUMIN/GLOB SERPL: 1 G/DL
ALP SERPL-CCNC: 111 U/L (ref 30–99)
ALT SERPL-CCNC: 25 U/L (ref 2–50)
ANION GAP SERPL CALC-SCNC: 11 MMOL/L (ref 7–16)
AST SERPL-CCNC: 44 U/L (ref 12–45)
BILIRUB SERPL-MCNC: 0.4 MG/DL (ref 0.1–1.5)
BUN SERPL-MCNC: 8 MG/DL (ref 8–22)
CALCIUM ALBUM COR SERPL-MCNC: 9.5 MG/DL (ref 8.5–10.5)
CALCIUM SERPL-MCNC: 8.9 MG/DL (ref 8.5–10.5)
CHLORIDE SERPL-SCNC: 110 MMOL/L (ref 96–112)
CO2 SERPL-SCNC: 18 MMOL/L (ref 20–33)
CREAT SERPL-MCNC: 0.71 MG/DL (ref 0.5–1.4)
GFR SERPLBLD CREATININE-BSD FMLA CKD-EPI: 116 ML/MIN/1.73 M 2
GLOBULIN SER CALC-MCNC: 3.1 G/DL (ref 1.9–3.5)
GLUCOSE SERPL-MCNC: 110 MG/DL (ref 65–99)
POTASSIUM SERPL-SCNC: 4.1 MMOL/L (ref 3.6–5.5)
PROT SERPL-MCNC: 6.3 G/DL (ref 6–8.2)
SODIUM SERPL-SCNC: 139 MMOL/L (ref 135–145)

## 2024-12-16 PROCEDURE — 770002 HCHG ROOM/CARE - OB PRIVATE (112)

## 2024-12-16 PROCEDURE — 36415 COLL VENOUS BLD VENIPUNCTURE: CPT

## 2024-12-16 PROCEDURE — A9270 NON-COVERED ITEM OR SERVICE: HCPCS | Performed by: OBSTETRICS & GYNECOLOGY

## 2024-12-16 PROCEDURE — 80053 COMPREHEN METABOLIC PANEL: CPT

## 2024-12-16 PROCEDURE — 700102 HCHG RX REV CODE 250 W/ 637 OVERRIDE(OP): Performed by: OBSTETRICS & GYNECOLOGY

## 2024-12-16 RX ORDER — NIFEDIPINE 30 MG/1
30 TABLET, EXTENDED RELEASE ORAL
Status: DISCONTINUED | OUTPATIENT
Start: 2024-12-17 | End: 2024-12-17 | Stop reason: HOSPADM

## 2024-12-16 RX ADMIN — ACETAMINOPHEN 1000 MG: 500 TABLET ORAL at 12:50

## 2024-12-16 RX ADMIN — ACETAMINOPHEN 1000 MG: 500 TABLET ORAL at 06:24

## 2024-12-16 RX ADMIN — LABETALOL HYDROCHLORIDE 300 MG: 300 TABLET, FILM COATED ORAL at 17:02

## 2024-12-16 RX ADMIN — IBUPROFEN 800 MG: 800 TABLET, FILM COATED ORAL at 14:51

## 2024-12-16 RX ADMIN — IBUPROFEN 800 MG: 800 TABLET, FILM COATED ORAL at 06:24

## 2024-12-16 RX ADMIN — LABETALOL HYDROCHLORIDE 300 MG: 300 TABLET, FILM COATED ORAL at 06:24

## 2024-12-16 ASSESSMENT — PAIN DESCRIPTION - PAIN TYPE
TYPE: SURGICAL PAIN
TYPE: ACUTE PAIN;SURGICAL PAIN
TYPE: SURGICAL PAIN
TYPE: ACUTE PAIN

## 2024-12-16 NOTE — LACTATION NOTE
This note was copied from a baby's chart.  DWAYNE  spoke with mother when she was in NBN feeding her baby.  Mom has decided to pump and provide for her baby . Baby remains under observation in the NBN for oxygen desaturations.   Mom will be discharged today and room in if a room available.   Mom only pumped 5 mls this last session she feels that her pump value was not assembled properly. DWAYNE encouraged mother to look for a small tear in the valve/valves and replace with extra pieces that are in her pump kit.    Mom acknowledges education provided and understands to pump Q 3 hrs

## 2024-12-16 NOTE — CARE PLAN
Problem: Infection - Postpartum  Goal: Postpartum patient will be free of signs and symptoms of infection  Outcome: Progressing     Problem: Pain - Standard  Goal: Alleviation of pain or a reduction in pain to the patient’s comfort goal  Outcome: Progressing     Problem: Early Mobilization - Post Surgery  Goal: Early mobilization post surgery  Outcome: Progressing     The patient is Stable - Low risk of patient condition declining or worsening    Shift Goals  Clinical Goals: Pain control, lochia WDL  Patient Goals: Pain control, rest  Family Goals: Rest, support    Progress made toward(s) clinical / shift goals:  Pain well controlled with non-narcotic analgesia, pt is able to ambulate and care for self and visit infant in NBN without difficulty. Lochia remains light without clots expressed, performing maria del rosario care and pad changes independently. No s/s infection noted on assessment, remains afebrile.    Patient is not progressing towards the following goals: NA

## 2024-12-16 NOTE — PROGRESS NOTES
Bedside report received from dayshift ALEXIA Pearce. 12hr chart check complete, MAR and orders reviewed

## 2024-12-16 NOTE — DISCHARGE INSTRUCTIONS

## 2024-12-16 NOTE — CARE PLAN
Problem: Psychosocial - Postpartum  Goal: Patient will verbalize and demonstrate effective bonding and parenting behavior  Outcome: Progressing     Problem: Infection - Postpartum  Goal: Postpartum patient will be free of signs and symptoms of infection  Outcome: Progressing   The patient is Stable - Low risk of patient condition declining or worsening    Shift Goals  Clinical Goals: VSS  Patient Goals: breastfeeding  Family Goals: support    Progress made toward(s) clinical / shift goals:  Pt ambulating and performing self care and visiting infant in NBN. Vital signs WDL

## 2024-12-16 NOTE — DISCHARGE SUMMARY
"Discharge Summary    Admission Date: 2024    Discharge Date: 2024    Diagnosis:  S/P PLTCS  Acute postoperative pain    Subjective: Pain controlled. Normal lochia. Eating, voiding and ambulating without difficulty.  Infant is in the nursery secondary to desatting with breast-feeding.    BP (!) 131/90   Pulse 81   Temp 36.4 °C (97.5 °F) (Temporal)   Resp 17   Ht 1.727 m (5' 8\")   Wt 95.7 kg (211 lb)   LMP 2024 (Exact Date)   SpO2 98%   Breastfeeding Yes   BMI 32.08 kg/m²     GEN: NAD  GI:soft, NT, ND, incision is clean, dry, and intact with Mepilex dressing in place  :fundus firm and below umbilicus  EXT:no edema    Hospital Course: Patient is a 33-year-old  admitted at 37 weeks and 3 days with severe fetal growth restriction and breech malpresentation.  She is status post primary  delivery of a viable male infant with Apgars of 8 and 9 on 2024.  EBL of 700 cc.  No complications.  Post operatively, her blood pressures were noted to be intermittently nonsevere range elevated.  Preeclampsia laboratory evaluation was unremarkable. She was meeting all postpartum goals and was stable for discharge home on postoperative day 4.    Discharge Instructions   1. Diet : general  2. Activity:   No heavy lifting, pushing, pulling more than 10 lbs for 6 weeks  Pelvic rest for 6 weeks  No driving while on narcotics.   Keep incision clean and dry. Wash with soap and water    Current Outpatient Medications   Medication Sig Dispense Refill    acetaminophen (TYLENOL) 500 MG Tab Take 2 Tablets by mouth every 6 hours for 5 days, THEN 2 Tablets every 6 hours as needed for Mild Pain for up to 5 days. 30 Tablet 0    docusate sodium 100 MG Cap Take 100 mg by mouth 2 times a day as needed for Constipation. 60 Capsule 3    ibuprofen (MOTRIN) 800 MG Tab Take 1 Tablet by mouth every 8 hours for 5 days, THEN 1 Tablet every 8 hours as needed for Mild Pain or Moderate Pain for up to 5 days. 30 Tablet 1 "    oxyCODONE immediate-release (ROXICODONE) 5 MG Tab Take 1 Tablet by mouth every 8 hours as needed for Severe Pain for up to 5 days. 15 Tablet 0         Follow up: 2 weeks    Complications:none    Roopa Fraser M.D.

## 2024-12-16 NOTE — PROGRESS NOTES
0800: Assessment complete. Plan of care discussed with patient. Patient encouraged to call with any needs.     1300: MD Garcia notified of CMP results. No new orders at this time.    1621: Patient discharge education given.     1727: Pt stated that she felt anxious. MD notified of BP see new orders.

## 2024-12-17 ENCOUNTER — PHARMACY VISIT (OUTPATIENT)
Dept: PHARMACY | Facility: MEDICAL CENTER | Age: 34
End: 2024-12-17
Payer: COMMERCIAL

## 2024-12-17 VITALS
TEMPERATURE: 97.9 F | RESPIRATION RATE: 16 BRPM | HEIGHT: 68 IN | BODY MASS INDEX: 31.98 KG/M2 | WEIGHT: 211 LBS | OXYGEN SATURATION: 96 % | SYSTOLIC BLOOD PRESSURE: 129 MMHG | DIASTOLIC BLOOD PRESSURE: 87 MMHG | HEART RATE: 83 BPM

## 2024-12-17 PROCEDURE — 700102 HCHG RX REV CODE 250 W/ 637 OVERRIDE(OP): Performed by: OBSTETRICS & GYNECOLOGY

## 2024-12-17 PROCEDURE — A9270 NON-COVERED ITEM OR SERVICE: HCPCS | Performed by: OBSTETRICS & GYNECOLOGY

## 2024-12-17 PROCEDURE — RXMED WILLOW AMBULATORY MEDICATION CHARGE: Performed by: OBSTETRICS & GYNECOLOGY

## 2024-12-17 RX ORDER — NIFEDIPINE 30 MG/1
30 TABLET, EXTENDED RELEASE ORAL DAILY
Qty: 30 TABLET | Refills: 3 | Status: SHIPPED | OUTPATIENT
Start: 2024-12-18

## 2024-12-17 RX ADMIN — LABETALOL HYDROCHLORIDE 300 MG: 300 TABLET, FILM COATED ORAL at 06:22

## 2024-12-17 RX ADMIN — ACETAMINOPHEN 1000 MG: 500 TABLET ORAL at 06:22

## 2024-12-17 RX ADMIN — NIFEDIPINE 30 MG: 30 TABLET, FILM COATED, EXTENDED RELEASE ORAL at 06:22

## 2024-12-17 RX ADMIN — DOCUSATE SODIUM 100 MG: 100 CAPSULE, LIQUID FILLED ORAL at 06:22

## 2024-12-17 ASSESSMENT — PAIN DESCRIPTION - PAIN TYPE
TYPE: ACUTE PAIN

## 2024-12-17 NOTE — PROGRESS NOTES
0700: Bedside report received from Linda HALE, updated on POC    1605: Patient discharged home in stable condition. Discharge education provided. Medications reviewed. All questions answered. PIV removed. All belongings sent with patient.

## 2024-12-17 NOTE — CARE PLAN
The patient is Stable - Low risk of patient condition declining or worsening    Shift Goals  Clinical Goals: VSS, discharge tomorrow  Patient Goals: discharge tomorrow  Family Goals: rest, support    Progress made toward(s) clinical / shift goals: No pain, MOB been pumping and delivering her breast milk to nursery where infant is in. BP within limits    Patient is not progressing towards the following goals:      Problem: Psychosocial - Postpartum  Goal: Patient will verbalize and demonstrate effective bonding and parenting behavior  Outcome: Progressing     Problem: Altered Physiologic Condition  Goal: Patient physiologically stable as evidenced by normal lochia, palpable uterine involution and vitals within normal limits  Outcome: Progressing

## 2024-12-17 NOTE — PROGRESS NOTES
Hospital Day : 5    S: DOing well; swathi diet; min lochia    O:  Vitals:    12/16/24 1730 12/16/24 2200 12/17/24 0200 12/17/24 0600   BP: (!) 160/108 129/85 134/82 (!) 137/93   Pulse:  83 76 78   Resp:  18 18 18   Temp:  37.2 °C (98.9 °F) 36.3 °C (97.3 °F) 36.3 °C (97.3 °F)   TempSrc:  Temporal Temporal Temporal   SpO2:  96% 99% 96%   Weight:       Height:               Recent Labs     12/16/24  0807   SODIUM 139   POTASSIUM 4.1   CHLORIDE 110   CO2 18*   GLUCOSE 110*   BUN 8   CREATININE 0.71   CALCIUM 8.9         Abd soft ff    A: pod 5 sp 1ltcs breech; chronic htn; fgr; doing well    P: dc

## 2024-12-17 NOTE — DISCHARGE SUMMARY
DATE OF ADMISSION:  2024   DATE OF DISCHARGE:  2024     OBSTETRICS-GYNECOLOGY DISCHARGE SUMMARY     ADMITTING DIAGNOSES:  1.  Pregnancy at 37 and 3/7th weeks.  2.  Breech presentation.  3.  Chronic hypertension.  4.  Fetal growth restriction.     DISCHARGE DIAGNOSES:  1.  Pregnancy at 37 and 3/7th weeks.  2.  Breech presentation.  3.  Chronic hypertension.  4.  Fetal growth restriction.  5.  Status post primary low transverse .     HOSPITAL COURSE IN DETAIL:  This patient was admitted on the aforementioned   date with the aforementioned diagnoses.  Primary low transverse  was   performed on the .  Findings include male infant in breech presentation.    The patient recovered in stable condition.  On postpartum day #1, she was   restarted on her labetalol.  By postoperative day #2, she was ambulating well,   tolerating regular diet.  On postoperative day #3, she began to have elevated   blood pressures again.  On postoperative day #4, she was started on Procardia   XL 30.  Today, on postoperative day #5, she desires discharge home.  She is   afebrile.  Majority of her blood pressures are normal without severe range.    She has no symptoms of PIH. H and H is stable.  Abdomen is soft with full   fundus below the umbilicus.  Wound is clean, dry and intact.  No erythema,   induration or discharge.     ASSESSMENT:  At this time is postoperative day #5 status post primary low   transverse , breech presentation with fetal growth restriction and   chronic hypertension, doing well, desires discharge home.     PLAN:  At this time;  1.  Discharge to home.  2.  Follow up in 1 week for blood pressure check.  3.  Pelvic rest.  4.  Lifting precautions.  5.  Scripts for Motrin, Procardia and labetalol consented and written.        ______________________________  MD DEBBIE Manning/JEROME    DD:  2024 06:56  DT:  2024 07:19    Job#:  [id number]

## 2024-12-17 NOTE — CARE PLAN
The patient is Stable - Low risk of patient condition declining or worsening    Shift Goals  Clinical Goals: Monitor BP, discharge  Patient Goals: Discharge  Family Goals: Support    Progress made toward(s) clinical / shift goals:      Problem: Psychosocial - Postpartum  Goal: Patient will verbalize and demonstrate effective bonding and parenting behavior  Outcome: Progressing  Note: t       Patient is not progressing towards the following goals:

## 2024-12-18 NOTE — LACTATION NOTE
This note was copied from a baby's chart.  Follow up lactation support: Baby remains in NBN under observation. Mom does go to nursery for bottle feedings. Mom states he is now pumping about 50-6- mls.   LC reviewed mom's pumping plan.MO has no concerns and feels things are going well.   Parents are planning on discharging to since baby has remained stable and passed the car seat test.   Mom feels things are going well and has no concerns.

## 2025-01-27 ENCOUNTER — APPOINTMENT (OUTPATIENT)
Dept: MEDICAL GROUP | Facility: LAB | Age: 35
End: 2025-01-27
Payer: COMMERCIAL

## 2025-02-13 ENCOUNTER — APPOINTMENT (OUTPATIENT)
Dept: MEDICAL GROUP | Facility: LAB | Age: 35
End: 2025-02-13
Payer: COMMERCIAL

## 2025-02-14 ENCOUNTER — APPOINTMENT (OUTPATIENT)
Dept: MEDICAL GROUP | Facility: LAB | Age: 35
End: 2025-02-14
Payer: COMMERCIAL

## 2025-02-19 ENCOUNTER — OFFICE VISIT (OUTPATIENT)
Dept: MEDICAL GROUP | Facility: LAB | Age: 35
End: 2025-02-19
Payer: COMMERCIAL

## 2025-02-19 VITALS
BODY MASS INDEX: 30.01 KG/M2 | SYSTOLIC BLOOD PRESSURE: 102 MMHG | RESPIRATION RATE: 16 BRPM | OXYGEN SATURATION: 96 % | TEMPERATURE: 97.7 F | DIASTOLIC BLOOD PRESSURE: 70 MMHG | HEART RATE: 86 BPM | WEIGHT: 198 LBS | HEIGHT: 68 IN

## 2025-02-19 DIAGNOSIS — I10 ESSENTIAL HYPERTENSION: ICD-10-CM

## 2025-02-19 DIAGNOSIS — Z00.00 ANNUAL PHYSICAL EXAM: ICD-10-CM

## 2025-02-19 DIAGNOSIS — Z30.41 ENCOUNTER FOR SURVEILLANCE OF CONTRACEPTIVE PILLS: ICD-10-CM

## 2025-02-19 PROCEDURE — 99395 PREV VISIT EST AGE 18-39: CPT | Performed by: FAMILY MEDICINE

## 2025-02-19 RX ORDER — LISINOPRIL 10 MG/1
10 TABLET ORAL DAILY
Qty: 100 TABLET | Refills: 3 | Status: SHIPPED | OUTPATIENT
Start: 2025-02-19 | End: 2026-03-26

## 2025-02-19 RX ORDER — NORETHINDRONE 0.35 MG/1
1 TABLET ORAL DAILY
COMMUNITY
Start: 2025-01-21 | End: 2025-02-18

## 2025-02-19 ASSESSMENT — PATIENT HEALTH QUESTIONNAIRE - PHQ9: CLINICAL INTERPRETATION OF PHQ2 SCORE: 0

## 2025-02-19 ASSESSMENT — FIBROSIS 4 INDEX: FIB4 SCORE: 1

## 2025-02-19 NOTE — PROGRESS NOTES
Verbal consent was acquired by the patient to use [employer] ambient listening note generation during this visit Yes    Subjective:   Federica Acuña is a 34 y.o. female here today for   Chief Complaint   Patient presents with    Hypertension Follow-up     History of Present Illness  The patient is a 34-year-old female who presents today for an annual physical exam. She has a history of hypertension, currently on labetalol 300 mg twice daily.    She expresses a desire to reduce her medication regimen to one pill per day, citing difficulty in remembering to take two doses due to the demands of caring for her infant. She consistently takes her morning dose but reports variability in the timing of her afternoon dose, sometimes taking it a few hours earlier or later. She recalls experiencing hypotension immediately postpartum, which was attributed to her delivery. However, a few weeks ago, she recorded a blood pressure reading of 126/90 during a period of malaise. She notes that her diastolic blood pressure frequently exceeds 80. This week, she experienced an episode of lightheadedness, which she initially attributed to hypertension, but upon measurement, her blood pressure was found to be low. She reports no chest pain or palpitations. She is not currently breastfeeding. She reports no concerns related to depression or anxiety. She reports no abdominal pain, back pain, lymphadenopathy, constipation, or diarrhea. She reports no issues with vision or hearing. She has not had a recent dental visit, despite experiencing dental pain during her pregnancy. She has not been engaging in regular exercise. She reports adequate sleep, typically from 11 PM to 5 AM. She has been prescribed a new contraceptive by her OB-GYN but has not yet started it. She has previously been on lisinopril.    Supplemental Information  She underwent a  delivery without complications and has since recovered.    SOCIAL HISTORY  She  "does not endorse alcohol intake, smoking, and drug use.    MEDICATIONS  Current: Labetalol  Past: Lisinopril      No Known Allergies      Current medicines (including changes today)  Current Outpatient Medications   Medication Sig Dispense Refill    labetalol (NORMODYNE) 300 MG Tab Take 1 Tablet by mouth 2 times a day. 180 Tablet 3    NIFEdipine SR (PROCADIA-XL) 30 MG tablet Take 1 Tablet by mouth every day. 30 Tablet 3    docusate sodium 100 MG Cap Take 100 mg by mouth 2 times a day as needed for Constipation. 60 Capsule 3     No current facility-administered medications for this visit.     She  has a past medical history of Dyslipidemia (04/11/2018), Hypertension, Pregnant, and Pyloric stenosis.    ROS   ROS  -See HPI     Objective:     Physical Exam:  /70   Pulse 86   Temp 36.5 °C (97.7 °F) (Temporal)   Resp 16   Ht 1.727 m (5' 7.99\")   Wt 89.8 kg (198 lb)   SpO2 96%  Body mass index is 30.11 kg/m².   Constitutional: Alert, no distress.  Skin: Warm, dry, good turgor, no rashes in visible areas.  Eye: Equal, round and reactive, conjunctiva clear, lids normal.  ENMT: TM's clear bilaterally, lips without lesions, good dentition, oropharynx clear.  Neck: Trachea midline, no masses, no thyromegaly. No cervical or supraclavicular lymphadenopathy.  Respiratory: Unlabored respiratory effort, lungs clear to auscultation, no wheezes, no rhonchi.  Cardiovascular: Normal S1, S2, no murmur, no edema.  Abdomen: Soft, non-tender, no masses, no hepatosplenomegaly.  Psych: Alert and oriented x3, normal affect and mood.    Results      Assessment and Plan:     Assessment & Plan  1. Hypertension.  Her blood pressure readings have been consistently low, necessitating a cautious approach to medication management. She is advised to maintain a healthy diet, engage in regular exercise, and monitor her blood pressure at home. She is also advised to avoid high sodium foods, ensure adequate protein intake, and limit " carbohydrate consumption. Given that lisinopril can be harmful during pregnancy, she is recommended to start birth control while on this medication as a precautionary measure. The current plan is to transition her to a longer-acting antihypertensive medication, specifically lisinopril 10 mg daily. The prescription for lisinopril will [facility] to [facility]. She is instructed to discontinue labetalol. If she becomes pregnant again, we will consider switching her medication.    2. Annual   -All questions concerns were answered at this time.  -Vaccinations/screenings needed at this time: Up-to-date on labs and screenings.  Will follow-up with OB/GYN for Pap smear.  -Labs reviewed, no concerns.  -Discussed the importance of a healthy, Mediterranean style diet, routine exercise regimen.  -Discussed general safety measures including seatbelts, helmets, avoidance of smoking, sunscreen, hydration.  -Follow-up for general physical exam on a yearly basis, sooner if needed.      Follow-up  The patient will follow up in 3 months or sooner if her blood pressure becomes elevated.    Orders:  1. Essential hypertension  - lisinopril (PRINIVIL) 10 MG Tab; Take 1 Tablet by mouth every day.  Dispense: 100 Tablet; Refill: 3    2. Encounter for surveillance of contraceptive pills    Other orders  - YAHIR 0.35 MG tablet; Take 1 Tablet by mouth every day.        Followup: No follow-ups on file.         PLEASE NOTE: This dictation was created using voice recognition and [employer] ambient listening software. I have made every reasonable attempt to correct obvious errors, but I expect that there are errors of grammar and possibly content that I did not discover before finalizing the note.

## 2025-05-12 DIAGNOSIS — I10 ESSENTIAL HYPERTENSION: ICD-10-CM

## 2025-05-12 RX ORDER — LISINOPRIL 10 MG/1
10 TABLET ORAL DAILY
Qty: 100 TABLET | Refills: 3 | Status: SHIPPED | OUTPATIENT
Start: 2025-05-12 | End: 2026-06-16

## 2025-06-20 ENCOUNTER — APPOINTMENT (OUTPATIENT)
Dept: URBAN - METROPOLITAN AREA CLINIC 22 | Facility: CLINIC | Age: 35
Setting detail: DERMATOLOGY
End: 2025-06-20

## 2025-06-20 DIAGNOSIS — L71.0 PERIORAL DERMATITIS: ICD-10-CM | Status: INADEQUATELY CONTROLLED

## 2025-06-20 DIAGNOSIS — D22 MELANOCYTIC NEVI: ICD-10-CM

## 2025-06-20 PROBLEM — D23.62 OTHER BENIGN NEOPLASM OF SKIN OF LEFT UPPER LIMB, INCLUDING SHOULDER: Status: ACTIVE | Noted: 2025-06-20

## 2025-06-20 PROBLEM — D22.61 MELANOCYTIC NEVI OF RIGHT UPPER LIMB, INCLUDING SHOULDER: Status: ACTIVE | Noted: 2025-06-20

## 2025-06-20 PROCEDURE — ? DEFER

## 2025-06-20 PROCEDURE — ? PRESCRIPTION MEDICATION MANAGEMENT

## 2025-06-20 PROCEDURE — ? COUNSELING

## 2025-06-20 PROCEDURE — ? PRESCRIPTION

## 2025-06-20 PROCEDURE — ? OBSERVATION

## 2025-06-20 RX ORDER — PIMECROLIMUS 10 MG/G
CREAM TOPICAL BID
Qty: 30 | Refills: 3 | Status: ERX | COMMUNITY
Start: 2025-06-20

## 2025-06-20 RX ADMIN — PIMECROLIMUS: 10 CREAM TOPICAL at 00:00

## 2025-06-20 ASSESSMENT — LOCATION SIMPLE DESCRIPTION DERM
LOCATION SIMPLE: LEFT CHEEK
LOCATION SIMPLE: RIGHT CHEEK
LOCATION SIMPLE: RIGHT HAND

## 2025-06-20 ASSESSMENT — LOCATION DETAILED DESCRIPTION DERM
LOCATION DETAILED: RIGHT RADIAL DORSAL HAND
LOCATION DETAILED: LEFT SUPERIOR MEDIAL BUCCAL CHEEK
LOCATION DETAILED: RIGHT SUPERIOR MEDIAL BUCCAL CHEEK

## 2025-06-20 ASSESSMENT — LOCATION ZONE DERM
LOCATION ZONE: HAND
LOCATION ZONE: FACE

## 2025-07-17 ENCOUNTER — OFFICE VISIT (OUTPATIENT)
Dept: MEDICAL GROUP | Facility: LAB | Age: 35
End: 2025-07-17
Payer: COMMERCIAL

## 2025-07-17 VITALS
WEIGHT: 205 LBS | HEIGHT: 68 IN | SYSTOLIC BLOOD PRESSURE: 126 MMHG | DIASTOLIC BLOOD PRESSURE: 76 MMHG | HEART RATE: 93 BPM | RESPIRATION RATE: 14 BRPM | BODY MASS INDEX: 31.07 KG/M2 | OXYGEN SATURATION: 97 % | TEMPERATURE: 98.5 F

## 2025-07-17 DIAGNOSIS — Z76.89 ENCOUNTER FOR WEIGHT MANAGEMENT: ICD-10-CM

## 2025-07-17 DIAGNOSIS — Z30.011 ENCOUNTER FOR INITIAL PRESCRIPTION OF CONTRACEPTIVE PILLS: Primary | ICD-10-CM

## 2025-07-17 DIAGNOSIS — I10 ESSENTIAL HYPERTENSION: ICD-10-CM

## 2025-07-17 PROCEDURE — 3074F SYST BP LT 130 MM HG: CPT | Performed by: FAMILY MEDICINE

## 2025-07-17 PROCEDURE — 99214 OFFICE O/P EST MOD 30 MIN: CPT | Performed by: FAMILY MEDICINE

## 2025-07-17 PROCEDURE — 3078F DIAST BP <80 MM HG: CPT | Performed by: FAMILY MEDICINE

## 2025-07-17 RX ORDER — LABETALOL 300 MG/1
1 TABLET, FILM COATED ORAL 2 TIMES DAILY
COMMUNITY

## 2025-07-17 RX ORDER — LISINOPRIL 20 MG/1
20 TABLET ORAL DAILY
Qty: 100 TABLET | Refills: 3 | Status: SHIPPED | OUTPATIENT
Start: 2025-07-17 | End: 2025-07-31 | Stop reason: SDUPTHER

## 2025-07-17 RX ORDER — LEVONORGESTREL/ETHIN.ESTRADIOL 0.1-0.02MG
1 TABLET ORAL DAILY
Qty: 90 TABLET | Refills: 3 | Status: SHIPPED | OUTPATIENT
Start: 2025-07-17 | End: 2025-07-31 | Stop reason: SDUPTHER

## 2025-07-17 RX ORDER — ACETAMINOPHEN AND CODEINE PHOSPHATE 120; 12 MG/5ML; MG/5ML
SOLUTION ORAL
COMMUNITY
Start: 2025-05-14

## 2025-07-17 ASSESSMENT — FIBROSIS 4 INDEX: FIB4 SCORE: 1

## 2025-07-17 NOTE — PROGRESS NOTES
Verbal consent was acquired by the patient to use [employer] ambient listening note generation during this visit Yes    Subjective:   Federica Acuña is a 34 y.o. female here today for   Chief Complaint   Patient presents with    Hypertension Follow-up    Weight Gain     Discuss medication to help with weight     Lightheadedness     History of Present Illness  The patient is a 34-year-old female who presents today to discuss hypertension. She is currently treating hypertension with 10 mg lisinopril. Additionally, she wants to discuss weight gain and potential treatment.    She reports that her diastolic blood pressure has not decreased below 80, consistently remaining around 85 or 86. During her last appointment, it was recorded as 88. Despite attempts to lower it by resting for 10 to 15 minutes, there was no significant change. She experienced an episode of lightheadedness earlier today, which was severe enough to make her feel faint while sitting down for lunch. This episode was accompanied by blurred vision but no chest pain or palpitations. The episode lasted for 3 to 4 minutes. She has been experiencing occasional lightheadedness, but today's episode was the longest. She ensures she stays hydrated and uses fresh batteries in her blood pressure machine. She also makes sure to rest and elevate her feet before taking her blood pressure. Her systolic blood pressure readings have been consistently in the 120s. She continues to take lisinopril daily. She notes that her diastolic blood pressure readings have been in the 80s since switching from labetalol due to her plans to conceive. Prior to this, her diastolic readings were consistently below 80.    She has been struggling with weight loss. After giving birth, she initially lost weight but has since regained it. A month ago, she eliminated sugar from her diet, which resulted in a 3-pound weight loss. However, she feels that her current efforts are not  "yielding the same results as previous attempts. She is considering getting an IUD but is unsure as she is contemplating having another child soon due to her age. She is currently on Micronor birth control, prescribed by her OB/GYN due to her hypertension. She would like to switch back to her previous birth control, which she tolerated well.        Allergies[1]      Current medicines (including changes today)  Current Medications[2]  She  has a past medical history of Dyslipidemia (04/11/2018), Hypertension, Pregnant, and Pyloric stenosis.    ROS   ROS  -See HPI     Objective:     Physical Exam:  /76 (BP Location: Left arm, Patient Position: Sitting, BP Cuff Size: Adult)   Pulse 93   Temp 36.9 °C (98.5 °F) (Temporal)   Resp 14   Ht 1.727 m (5' 7.99\")   Wt 93 kg (205 lb)   SpO2 97%  Body mass index is 31.18 kg/m².   Constitutional: Alert, no distress, well-groomed.  Skin: No rashes in visible areas.  Eye: Round. Conjunctiva clear, lids normal. No icterus.   ENMT: Lips pink without lesions, good dentition, moist mucous membranes. Phonation normal.  Neck: No masses, no thyromegaly. Moves freely without pain.  Respiratory: Unlabored respiratory effort, no cough or audible wheeze  Psych: Alert and oriented x3, normal affect and mood.     Results      Assessment and Plan:     Assessment & Plan  Hypertension  Blood pressure readings at home show diastolic consistently in the 80s. Blood pressure in the office: 126/76.  Treatment plan: Increase lisinopril to 20 mg daily; monitor blood pressure regularly and report any symptoms such as lightheadedness, dizziness, or chest pain.    Weight gain  Difficulty losing weight despite dietary changes, including cutting out sugar. Current birth control, Micronor, may contribute to weight gain or difficulty losing weight.  Treatment plan: Switch to a combined estrogen and progesterone birth control pill; start immediately. Focus on strength training, cardio, and resistance " exercises for weight loss and fat loss. Consider injectable medications like semaglutide or Mounjaro if weight loss remains challenging, though these options are costly and not covered by insurance.    Health maintenance  Pap smear last year was normal. Next Pap smear due in 2027.    Follow-up: Schedule follow-up visit in a few months to monitor progress.    Orders:  1. Essential hypertension  - lisinopril (PRINIVIL) 20 MG Tab; Take 1 Tablet by mouth every day.  Dispense: 100 Tablet; Refill: 3    2. Encounter for initial prescription of contraceptive pills  - levonorgestrel-ethinyl estradiol (AVIANE) 0.1-20 MG-MCG per tablet; Take 1 Tablet by mouth every day.  Dispense: 90 Tablet; Refill: 3    3. Encounter for weight management    Other orders  - norethindrone (MICRONOR) 0.35 MG tablet  - labetalol (NORMODYNE) 300 MG Tab; Take 1 Tablet by mouth 2 times a day.  - LABETALOL HCL PO; 300mg BID        Followup: No follow-ups on file.         PLEASE NOTE: This dictation was created using voice recognition and [employer] ambient listening software. I have made every reasonable attempt to correct obvious errors, but I expect that there are errors of grammar and possibly content that I did not discover before finalizing the note.         [1] No Known Allergies  [2]   Current Outpatient Medications   Medication Sig Dispense Refill    norethindrone (MICRONOR) 0.35 MG tablet       lisinopril (PRINIVIL) 10 MG Tab Take 1 Tablet by mouth every day. 100 Tablet 3    labetalol (NORMODYNE) 300 MG Tab Take 1 Tablet by mouth 2 times a day.      Prenatal-FE Bis-FA-DHA w/o A (COMPLETE PRENATAL/DHA PO)       LABETALOL HCL PO 300mg BID       No current facility-administered medications for this visit.

## 2025-07-31 DIAGNOSIS — I10 ESSENTIAL HYPERTENSION: ICD-10-CM

## 2025-07-31 DIAGNOSIS — Z30.011 ENCOUNTER FOR INITIAL PRESCRIPTION OF CONTRACEPTIVE PILLS: ICD-10-CM

## 2025-07-31 RX ORDER — LEVONORGESTREL/ETHIN.ESTRADIOL 0.1-0.02MG
1 TABLET ORAL DAILY
Qty: 90 TABLET | Refills: 3 | Status: SHIPPED | OUTPATIENT
Start: 2025-07-31

## 2025-07-31 RX ORDER — LISINOPRIL 20 MG/1
20 TABLET ORAL DAILY
Qty: 100 TABLET | Refills: 3 | Status: SHIPPED | OUTPATIENT
Start: 2025-07-31 | End: 2026-09-04

## 2025-07-31 NOTE — TELEPHONE ENCOUNTER
Received request via: Pharmacy    Was the patient seen in the last year in this department? Yes    Does the patient have an active prescription (recently filled or refills available) for medication(s) requested? No    Pharmacy Name: [facility]     Does the patient have custodial Plus and need 100-day supply? (This applies to ALL medications) Patient does not have SCP.

## (undated) DEVICE — PAD LAP STERILE 18 X 18 - (5/PK 40PK/CA)

## (undated) DEVICE — KIT SKIN NOSE AND MOUTH PRE-OP (20/CA)

## (undated) DEVICE — TRAY SPINAL ANESTHESIA NON-SAFETY (10/CA)

## (undated) DEVICE — BLANKET UNDERBODY ADULT - (10/CA)

## (undated) DEVICE — PLUMEPEN ULTRA 3/8 IN X 10 FT HOSE (20EA/CA)

## (undated) DEVICE — CANNULA O2 COMFORT SOFT EAR ADULT 7 FT TUBING (50/CA)

## (undated) DEVICE — PACK C-SECTION (2EA/CA)

## (undated) DEVICE — HEAD HOLDER JUNIOR/ADULT

## (undated) DEVICE — SOLUTION PLASMA-LYTE PH 7.4 INJ 1000ML (14EA/CA)

## (undated) DEVICE — KIT  I.V. START (100EA/CA)

## (undated) DEVICE — CANISTER SUCTION RIGID RED 1500CC (40EA/CA)

## (undated) DEVICE — CANISTER SUCTION 3000ML MECHANICAL FILTER AUTO SHUTOFF MEDI-VAC NONSTERILE LF DISP (40EA/CA)

## (undated) DEVICE — BAG SPONGE COUNT 10.25 X 32 - BLUE (250/CA)

## (undated) DEVICE — RETRACTOR O C SECTION LRY - (5/BX)

## (undated) DEVICE — WATER IRRIGATION STERILE 1000ML (12EA/CA)

## (undated) DEVICE — SUTURE 2-0 VICRYL PLUS CT-1 36 (36PK/BX)"

## (undated) DEVICE — CATHETER IV NON-SAFETY 18 GA X 1 1/4 (50/BX 4BX/CA)

## (undated) DEVICE — CLOSURE SKIN STRIP 1/2 X 4 IN - (STERI STRIP) (50/BX 4BX/CA)

## (undated) DEVICE — GLOVE BIOGEL INDICATOR SZ 6.5 SURGICAL PF LTX - (50PR/BX 4BX/CA)

## (undated) DEVICE — CHLORAPREP 26 ML APPLICATOR - ORANGE TINT(25/CA)

## (undated) DEVICE — TUBING CLEARLINK DUO-VENT - C-FLO (48EA/CA)

## (undated) DEVICE — DRESSING POST OP BORDER 4 X 10 (5EA/BX)

## (undated) DEVICE — SODIUM CHL IRRIGATION 0.9% 1000ML (12EA/CA)

## (undated) DEVICE — SUTURE 4-0 27IN VCRL PLUS ANTI (36PK/BX)

## (undated) DEVICE — SUTURE 0 VICRYL PLUS CT-1 - 36 INCH (36/BX)

## (undated) DEVICE — SPONGE SURGICAL PVP IODINE L8 IN (20EA/CA)

## (undated) DEVICE — BLANKET STERILE CHICKIE FOR L&D (100/CA)

## (undated) DEVICE — SET EXTENSION WITH 2 PORTS (48EA/CA) ***PART #2C8610 IS A SUBSTITUTE*****

## (undated) DEVICE — PACK ROOM TURNOVER L&D (12/CA)

## (undated) DEVICE — SUTURE ABSORBABLE MONOFILAMENT VIOLET MONOCRYL CT-1 L36 IN (36EA/BX)

## (undated) DEVICE — GLOVE BIOGEL SZ 6.5 SURGICAL PF LTX (50PR/BX 4BX/CA)

## (undated) DEVICE — SUTUREABS02-0 CT1 27IN - (36EA/BX)

## (undated) DEVICE — SLEEVE, SEQUENTIAL CALF REG